# Patient Record
Sex: MALE | Race: WHITE | NOT HISPANIC OR LATINO | Employment: OTHER | URBAN - METROPOLITAN AREA
[De-identification: names, ages, dates, MRNs, and addresses within clinical notes are randomized per-mention and may not be internally consistent; named-entity substitution may affect disease eponyms.]

---

## 2020-07-21 ENCOUNTER — TELEPHONE (OUTPATIENT)
Dept: NEPHROLOGY | Facility: CLINIC | Age: 75
End: 2020-07-21

## 2020-07-21 NOTE — TELEPHONE ENCOUNTER
Appt given August.  His wife  from glomelular nephritis and Dr Raines was recommended by Dr Bhatt.     I scanned his referral information from Dr Bhatt and attached to the  appointment encounter.     He also has historical information from [facility] available in Care Everywhere for [facility].

## 2020-07-31 PROBLEM — R06.02 SHORTNESS OF BREATH: Status: ACTIVE | Noted: 2020-07-31

## 2020-07-31 PROBLEM — E11.9 TYPE 2 DIABETES MELLITUS WITHOUT COMPLICATION, WITHOUT LONG-TERM CURRENT USE OF INSULIN: Status: ACTIVE | Noted: 2020-07-31

## 2020-07-31 PROBLEM — G61.81 CIDP (CHRONIC INFLAMMATORY DEMYELINATING POLYNEUROPATHY): Status: ACTIVE | Noted: 2020-07-31

## 2020-08-02 PROBLEM — N17.9 ACUTE ON CHRONIC RENAL FAILURE: Status: ACTIVE | Noted: 2020-08-02

## 2020-08-02 PROBLEM — N18.9 ACUTE ON CHRONIC RENAL FAILURE: Status: ACTIVE | Noted: 2020-08-02

## 2020-08-02 PROBLEM — J40 BRONCHITIS: Status: ACTIVE | Noted: 2020-08-02

## 2020-08-03 PROBLEM — I82.409 ACUTE DVT (DEEP VENOUS THROMBOSIS): Status: ACTIVE | Noted: 2020-08-03

## 2020-08-06 ENCOUNTER — TELEPHONE (OUTPATIENT)
Dept: NEPHROLOGY | Facility: CLINIC | Age: 75
End: 2020-08-06

## 2020-08-06 NOTE — TELEPHONE ENCOUNTER
His dialysis arrangements are being finalized.  When that is done we will communicate that with him  He will be seen in dialysis by me.  Anticipate d/c today/tomorrow

## 2020-08-06 NOTE — TELEPHONE ENCOUNTER
Spoke to pt's daughter and she states they did not discharge the pt because they did not have a chair for him at dialysis. Pt wants to be discharged. Pt Will travel to [place] or [facility] for dialysis if it means he can be discharged tomorrow. Please advise. She would like to hear back from us if not today tomorrow.

## 2020-08-06 NOTE — TELEPHONE ENCOUNTER
----- Message from Dom SMYTH Pradeeprolando sent at 8/6/2020  1:43 PM CDT -----  Contact: Veronica/Chantel Golden called in regarding patient.  Chantel stated that patient has never actually seen Dr. Raines at [facility] but Dr. Raines did come see him while he has been in the hospital.  Patient had a NP appointment for 4pm today that had to be cancelled.  Chantel stated that there seems to be some confusion as to when patient is going to be discharged & what his next plan of treatment will be?    Chantel would like a call back number is [phone]

## 2020-08-10 RX ORDER — LORAZEPAM 1 MG/1
1 TABLET ORAL NIGHTLY PRN
Qty: 30 TABLET | Refills: 1 | Status: SHIPPED | OUTPATIENT
Start: 2020-08-10 | End: 2021-02-10 | Stop reason: SDUPTHER

## 2020-08-12 ENCOUNTER — TELEPHONE (OUTPATIENT)
Dept: NEPHROLOGY | Facility: CLINIC | Age: 75
End: 2020-08-12

## 2020-08-12 NOTE — TELEPHONE ENCOUNTER
1.  Jean Paul called in iniitially at 420 to say her sister left a message several days ago and have not heard back from Dr. Raines.  She didn't understand why he couldn't have an appointment.  Educated re: how dialysis rounding works and advised, per MD, he plans to round on Mr. Ahn Friday at the dialysis unit.  Informed she may forward questions to the staff at [facility] or she is always welcome to call me. During the call we were disconnected twice.  We were able to resume conversation both times.      2.  I corrected the pharmacy information on the chart as the prescription was sent to old pharmacy.  I cancelled that prescription.  I then faxed to his preferred pharmacy ([address]).  They came to  a hard copy of the prescription.    Since they were anxious to get it, I gave it to the pharmacy here to fill and cancelled the prescription faxed to the [facility]'s by phone.       3.  I also provided updated Involvement in care form and signed it.      I left them waiting outside in the van and provided pharmacy with daughter phone number for when the RX was ready.  The patient and daughter seemed pleased with these interactions in the end.

## 2020-08-14 PROBLEM — N18.6 ESRD (END STAGE RENAL DISEASE): Status: ACTIVE | Noted: 2020-08-14

## 2020-08-19 ENCOUNTER — TELEPHONE (OUTPATIENT)
Dept: NEPHROLOGY | Facility: CLINIC | Age: 75
End: 2020-08-19

## 2020-08-19 NOTE — TELEPHONE ENCOUNTER
"She reports the first he was on it he felt great.  On Monday sally started having chest pain and started cramping.  He asked the nurse or tech and they explained they were pulling more off and he was not very happy with that.  Daughter wanted to know if they can "go back a little" and pull less and maybe have a more gradual increase if necessary.   "

## 2020-08-19 NOTE — TELEPHONE ENCOUNTER
----- Message from Odette Cortez sent at 8/19/2020  9:16 AM CDT -----  Type: Needs Medical Advice  Who Called:  Tiago / daughter  Best Call Back Number: [phone]  Additional Information: pt fell on Monday while being dialyzed. States that the nurse explained to him that more needed to be pulled off. Wants to know if less can be pulled off. Please give call back to discuss. thanks

## 2020-08-21 ENCOUNTER — TELEPHONE (OUTPATIENT)
Dept: NEPHROLOGY | Facility: CLINIC | Age: 75
End: 2020-08-21

## 2020-08-21 NOTE — TELEPHONE ENCOUNTER
----- Message from Bailey Solorio sent at 8/21/2020  3:06 PM CDT -----  Regarding: call back  Pt calling in regards to a call back from nurse in office please and can be contact at [phone]

## 2020-08-21 NOTE — TELEPHONE ENCOUNTER
I will forward to Dr Raines to see if he is able to address their questions at the dialysis unit.

## 2020-09-09 ENCOUNTER — TELEPHONE (OUTPATIENT)
Dept: VASCULAR SURGERY | Facility: CLINIC | Age: 75
End: 2020-09-09

## 2020-09-09 NOTE — TELEPHONE ENCOUNTER
----- Message from Zahra Bhatt sent at 9/9/2020 11:55 AM CDT -----  Regarding: appt access  Contact: abran hubbard/ michael hubbard/ michael ph#[phone], calling to schedule new consult with Dr Cartwright with mapping vessel and permit dialysis access  Call the patient to schedule at [phone] or [phone] daughter rylee chavira

## 2020-10-08 ENCOUNTER — TELEPHONE (OUTPATIENT)
Dept: VASCULAR SURGERY | Facility: CLINIC | Age: 75
End: 2020-10-08

## 2020-10-08 NOTE — TELEPHONE ENCOUNTER
Left multiple messages on home, mobile and daughter's mobile requesting a call back and advising of canceled appointment due to Dr Cartwright booking out of clinic for emergency surgery.

## 2020-11-05 ENCOUNTER — TELEPHONE (OUTPATIENT)
Dept: VASCULAR SURGERY | Facility: CLINIC | Age: 75
End: 2020-11-05

## 2020-11-05 NOTE — TELEPHONE ENCOUNTER
----- Message from Lorrie Momin sent at 11/5/2020  2:40 PM CST -----  Contact: daughter-rylee  Pt needs to reschedule her appointment that was no show.  Please call to advise.    Call Back: [phone]    Thanks

## 2020-12-02 ENCOUNTER — TELEPHONE (OUTPATIENT)
Dept: VASCULAR SURGERY | Facility: CLINIC | Age: 75
End: 2020-12-02

## 2020-12-02 NOTE — TELEPHONE ENCOUNTER
LATE ENTRY 12/2/20 1017  Left message on daughter's voicemail requesting a call back to rescheduled appointment, Dr Cartwright canceled clinic due to emergency surgery.

## 2020-12-03 NOTE — TELEPHONE ENCOUNTER
----- Message from Jennifer Comer sent at 12/3/2020  1:01 PM CST -----  Name of Who is Calling: JOSELITO GANDHI JR [[id number]]    What is the request in detail:Patient is requesting a call back  in regards to becoming a new Patient  Please contact to further discuss and advise      Can the clinic reply by [facility]: no     What Number to Call Back if not in [facility] : [phone]

## 2020-12-10 ENCOUNTER — OFFICE VISIT (OUTPATIENT)
Dept: VASCULAR SURGERY | Facility: CLINIC | Age: 75
End: 2020-12-10
Payer: MEDICARE

## 2020-12-10 VITALS
DIASTOLIC BLOOD PRESSURE: 76 MMHG | HEART RATE: 41 BPM | SYSTOLIC BLOOD PRESSURE: 128 MMHG | WEIGHT: 211.63 LBS | BODY MASS INDEX: 33.21 KG/M2 | HEIGHT: 67 IN

## 2020-12-10 DIAGNOSIS — N18.6 CKD (CHRONIC KIDNEY DISEASE) STAGE V REQUIRING CHRONIC DIALYSIS: Primary | ICD-10-CM

## 2020-12-10 DIAGNOSIS — Z99.2 CKD (CHRONIC KIDNEY DISEASE) STAGE V REQUIRING CHRONIC DIALYSIS: Primary | ICD-10-CM

## 2020-12-10 PROCEDURE — 99205 PR OFFICE/OUTPT VISIT, NEW, LEVL V, 60-74 MIN: ICD-10-PCS | Mod: S$PBB,,, | Performed by: THORACIC SURGERY (CARDIOTHORACIC VASCULAR SURGERY)

## 2020-12-10 PROCEDURE — 99205 OFFICE O/P NEW HI 60 MIN: CPT | Mod: S$PBB,,, | Performed by: THORACIC SURGERY (CARDIOTHORACIC VASCULAR SURGERY)

## 2020-12-10 PROCEDURE — 99999 PR PBB SHADOW E&M-EST. PATIENT-LVL IV: CPT | Mod: PBBFAC,,, | Performed by: THORACIC SURGERY (CARDIOTHORACIC VASCULAR SURGERY)

## 2020-12-10 PROCEDURE — 99214 OFFICE O/P EST MOD 30 MIN: CPT | Mod: PBBFAC,PO | Performed by: THORACIC SURGERY (CARDIOTHORACIC VASCULAR SURGERY)

## 2020-12-10 PROCEDURE — 99999 PR PBB SHADOW E&M-EST. PATIENT-LVL IV: ICD-10-PCS | Mod: PBBFAC,,, | Performed by: THORACIC SURGERY (CARDIOTHORACIC VASCULAR SURGERY)

## 2020-12-10 NOTE — LETTER
December 10, 2020      Shaheed Raines MD  [address] [facility] [address]  [address]  [facility] [address]           [facility]  [address] [place] [address]  Phone: [phone]          Patient: Poncho Ahn   MR Number: [id number]   YOB: 1945   Date of Visit: 12/10/2020       Dear Dr. Shaheed Raines:    Thank you for referring Poncho Ahn to me for evaluation. Attached you will find relevant portions of my assessment and plan of care.    If you have questions, please do not hesitate to call me. I look forward to following Poncho Ahn along with you.    Sincerely,    Nguyen Cartwright MD    Enclosure  CC:  No Recipients    If you would like to receive this communication electronically, please contact [email or web address] or [phone] to request more information on EpicCare Link access.    For providers and/or their staff who would like to refer a patient to [facility], please contact us through our one-stop-shop provider referral line, [facility], at [phone].    If you feel you have received this communication in error or would no longer like to receive these types of communications, please e-mail [email or web address]

## 2020-12-10 NOTE — PROGRESS NOTES
OFFICE VISIT      This patient was referred to me by Dr. Shaheed Raines      HISTORY OF PRESENT ILLNESS:  The patient is a 75-year-old gentleman with chronic kidney disease stage 5 requiring chronic hemodialysis.  He is being dialyzed via a tunneled hemodialysis catheter.  He needs an arteriovenous access.    Past Medical History:   Diagnosis Date    Asthma     CIDP (chronic inflammatory demyelinating polyneuropathy)     Diabetes mellitus, type 2     GERD (gastroesophageal reflux disease)     Hernia, abdominal     Hyperlipemia     Hypertension     Irregular heartbeat     SHIN (obstructive sleep apnea)     Renal disorder     stage 3 renal failure    Thyroid disease        Past Surgical History:   Procedure Laterality Date    APPENDECTOMY      CHOLECYSTECTOMY      TONSILLECTOMY         Review of patient's allergies indicates:   Allergen Reactions    Metronidazole Itching and Other (See Comments)     CRAMPS      Aspirin Other (See Comments)     GI UPSET    Dicyclomine Other (See Comments)     Hallucinations     Escitalopram oxalate Other (See Comments)     BREATHING DIFFICULTY    Fenofibric acid     Humibid     Ibuprofen Other (See Comments)     Upset stomach    Levofloxacin     Morphine     Penicillins Other (See Comments)    Promethazine Other (See Comments)     Hallucinations     Septa     Sulfa (sulfonamide antibiotics)     Trimethoprim     Triphenylmethane analogues Other (See Comments)       Current Outpatient Medications   Medication Sig Dispense Refill    acetaminophen (TYLENOL) 325 MG tablet Take 325 mg by mouth every 6 (six) hours as needed for Pain.      albuterol (PROVENTIL/VENTOLIN HFA) 90 mcg/actuation inhaler Inhale 2 puffs into the lungs every 6 (six) hours as needed for Wheezing. Rescue 8 g 3    alogliptin (NESINA) 12.5 mg Tab Take 6.25 mg by mouth once daily.      apixaban (ELIQUIS) 5 mg Tab Take 2 tablets (10 mg total) by mouth 2 (two) times daily. Take 2 tablets (10  mg) PO BID x 13 more doses (next dose 2100 on 8/7/20), then take 1 tablet (5 mg) PO BID thereafter. Do not stop taking unless instructed to by MD. 60 tablet 1    atorvastatin (LIPITOR) 80 MG tablet Take 80 mg by mouth once daily.      bismuth subsalicylate (BISMUTH) 262 mg Chew Take 1-2 tablets by mouth as needed (indigestion).       cholecalciferol, vitamin D3, (VITAMIN D3) 25 mcg (1,000 unit) capsule Take 1 capsule (1,000 Units total) by mouth once daily.  0    doxazosin (CARDURA) 4 MG tablet Take 4 mg by mouth every morning.       esomeprazole (NEXIUM) 20 MG capsule Take 20 mg by mouth before breakfast.      finasteride (PROSCAR) 5 mg tablet Take 5 mg by mouth once daily.      FLUoxetine 20 MG capsule Take 60 mg by mouth once daily.      folic acid/multivit-min/lutein (CENTRUM SILVER ORAL) Take 1 tablet by mouth once daily.      gabapentin (NEURONTIN) 800 MG tablet Take 800 mg by mouth 3 (three) times daily.      hyoscyamine (ANASPAZ,LEVSIN) 0.125 mg Tab Take 2 tablets (250 mcg total) by mouth every evening.      LORazepam (ATIVAN) 1 MG tablet Take 1 tablet (1 mg total) by mouth nightly as needed for Anxiety. 30 tablet 1    montelukast (SINGULAIR) 10 mg tablet Take 10 mg by mouth every evening.      ondansetron (ZOFRAN) 4 MG tablet Take 1 tablet (4 mg total) by mouth every 8 (eight) hours as needed for Nausea.       No current facility-administered medications for this visit.        History reviewed. No pertinent family history.    Social History     Socioeconomic History    Marital status:      Spouse name: Not on file    Number of children: Not on file    Years of education: Not on file    Highest education level: Not on file   Occupational History    Not on file   Social Needs    Financial resource strain: Not on file    Food insecurity     Worry: Not on file     Inability: Not on file    Transportation needs     Medical: Not on file     Non-medical: Not on file   Tobacco Use     Smoking status: Never Smoker    Smokeless tobacco: Never Used   Substance and Sexual Activity    Alcohol use: Never     Frequency: Never    Drug use: Never    Sexual activity: Not on file   Lifestyle    Physical activity     Days per week: Not on file     Minutes per session: Not on file    Stress: Not on file   Relationships    Social connections     Talks on phone: Not on file     Gets together: Not on file     Attends Spiritism service: Not on file     Active member of club or organization: Not on file     Attends meetings of clubs or organizations: Not on file     Relationship status: Not on file   Other Topics Concern    Not on file   Social History Narrative    Not on file       REVIEW OF SYSTEMS:  Weakness of the right hand since there was an attempt to access the right radial artery for an arterial blood gas.  He has generalized weakness.  He using uses a roller walker to walk.  All other systems are reviewed and are negative.        PHYSICAL EXAM:  Physical Exam  Vitals signs and nursing note reviewed.   Constitutional:       General: He is not in acute distress.     Appearance: He is not ill-appearing, toxic-appearing or diaphoretic.   HENT:      Head: Normocephalic and atraumatic.   Eyes:      General: No scleral icterus.     Extraocular Movements: Extraocular movements intact.      Conjunctiva/sclera: Conjunctivae normal.      Pupils: Pupils are equal, round, and reactive to light.   Neck:      Musculoskeletal: Normal range of motion.      Comments: There is a tunneled catheter exiting the right anterior chest wall and coursing up in the subcutaneous tunnel into the neck and into the right internal jugular vein.  Cardiovascular:      Rate and Rhythm: Normal rate and regular rhythm.      Comments: Normal bilateral brachial radial and femoral pulses  Pulmonary:      Effort: Pulmonary effort is normal. No respiratory distress.      Breath sounds: No stridor.   Abdominal:      Palpations: Abdomen is  soft.      Tenderness: There is no abdominal tenderness. There is no guarding or rebound.   Musculoskeletal: Normal range of motion.      Right lower leg: No edema.      Left lower leg: No edema.   Skin:     General: Skin is warm.   Neurological:      General: No focal deficit present.      Mental Status: He is alert and oriented to person, place, and time.      Cranial Nerves: No cranial nerve deficit.      Motor: Weakness present.      Gait: Gait abnormal.         Vitals:    12/10/20 1519   BP: 128/76   Pulse: (!) 41         IMPRESSION:  1.  Chronic kidney disease stage 5 requiring chronic hemodialysis  2.  Indwelling tunneled hemodialysis catheter  3.  Diabetes mellitus  4.  Obstructive sleep apnea  5.  Hypertension  6.  Hyperlipidemia  7.  Chronic inflammatory demyelinating polyneuropathy (CIDP)    RECOMMENDATIONS:  I think the patient needs an arteriovenous access.  He has a very good cephalic vein at the wrist.  He was unsure as to whether he wanted it done on the right side or on the left side.  He is right-handed and does most things with his right hand although he is somewhat weak in the right hand after an attempt to access the radial artery for arterial blood gases.  In the end he decided that he wanted it done on the left upper extremity.  We will proceed with left Gabino arteriovenous fistula.        Zack Cartwright MD

## 2020-12-11 ENCOUNTER — TELEPHONE (OUTPATIENT)
Dept: VASCULAR SURGERY | Facility: CLINIC | Age: 75
End: 2020-12-11

## 2021-01-04 ENCOUNTER — TELEPHONE (OUTPATIENT)
Dept: VASCULAR SURGERY | Facility: CLINIC | Age: 76
End: 2021-01-04

## 2021-01-11 PROBLEM — Z71.89 ADVANCED CARE PLANNING/COUNSELING DISCUSSION: Status: ACTIVE | Noted: 2021-01-11

## 2021-01-11 PROBLEM — U07.1 COVID-19 VIRUS INFECTION: Status: ACTIVE | Noted: 2021-01-11

## 2021-01-12 PROBLEM — Z99.2 ESRD ON DIALYSIS: Status: ACTIVE | Noted: 2020-08-14

## 2021-01-29 DIAGNOSIS — Z79.01 LONG TERM (CURRENT) USE OF ANTICOAGULANTS: ICD-10-CM

## 2021-01-29 DIAGNOSIS — N18.6 CKD (CHRONIC KIDNEY DISEASE) STAGE V REQUIRING CHRONIC DIALYSIS: Primary | ICD-10-CM

## 2021-01-29 DIAGNOSIS — N18.6 CHRONIC KIDNEY DISEASE, STAGE V REQUIRING CHRONIC DIALYSIS: ICD-10-CM

## 2021-01-29 DIAGNOSIS — Z99.2 CKD (CHRONIC KIDNEY DISEASE) STAGE V REQUIRING CHRONIC DIALYSIS: Primary | ICD-10-CM

## 2021-01-29 DIAGNOSIS — Z99.2 CHRONIC KIDNEY DISEASE, STAGE V REQUIRING CHRONIC DIALYSIS: ICD-10-CM

## 2021-02-01 RX ORDER — LIDOCAINE HYDROCHLORIDE 10 MG/ML
1 INJECTION, SOLUTION EPIDURAL; INFILTRATION; INTRACAUDAL; PERINEURAL ONCE
Status: CANCELLED | OUTPATIENT
Start: 2021-02-01 | End: 2021-02-01

## 2021-02-10 ENCOUNTER — TELEPHONE (OUTPATIENT)
Dept: VASCULAR SURGERY | Facility: CLINIC | Age: 76
End: 2021-02-10

## 2021-02-11 PROBLEM — N18.6 CHRONIC KIDNEY DISEASE, STAGE V REQUIRING CHRONIC DIALYSIS: Status: ACTIVE | Noted: 2021-02-11

## 2021-02-11 PROBLEM — Z99.2 CHRONIC KIDNEY DISEASE, STAGE V REQUIRING CHRONIC DIALYSIS: Status: ACTIVE | Noted: 2021-02-11

## 2021-02-11 RX ORDER — LORAZEPAM 1 MG/1
1 TABLET ORAL NIGHTLY PRN
Qty: 30 TABLET | Refills: 1 | Status: SHIPPED | OUTPATIENT
Start: 2021-02-11 | End: 2021-04-16 | Stop reason: SDUPTHER

## 2021-02-22 ENCOUNTER — TELEPHONE (OUTPATIENT)
Dept: VASCULAR SURGERY | Facility: CLINIC | Age: 76
End: 2021-02-22

## 2021-02-23 ENCOUNTER — TELEPHONE (OUTPATIENT)
Dept: VASCULAR SURGERY | Facility: CLINIC | Age: 76
End: 2021-02-23

## 2021-02-23 DIAGNOSIS — Z99.2 CKD (CHRONIC KIDNEY DISEASE) STAGE V REQUIRING CHRONIC DIALYSIS: Primary | ICD-10-CM

## 2021-02-23 DIAGNOSIS — Z79.01 LONG TERM (CURRENT) USE OF ANTICOAGULANTS: ICD-10-CM

## 2021-02-23 DIAGNOSIS — N18.6 CKD (CHRONIC KIDNEY DISEASE) STAGE V REQUIRING CHRONIC DIALYSIS: Primary | ICD-10-CM

## 2021-02-23 DIAGNOSIS — Z99.2 CHRONIC KIDNEY DISEASE, STAGE V REQUIRING CHRONIC DIALYSIS: ICD-10-CM

## 2021-02-23 DIAGNOSIS — N18.6 CHRONIC KIDNEY DISEASE, STAGE V REQUIRING CHRONIC DIALYSIS: ICD-10-CM

## 2021-02-23 DIAGNOSIS — Z01.818 PREOP TESTING: ICD-10-CM

## 2021-02-23 RX ORDER — LIDOCAINE HYDROCHLORIDE 10 MG/ML
1 INJECTION, SOLUTION EPIDURAL; INFILTRATION; INTRACAUDAL; PERINEURAL ONCE
Status: CANCELLED | OUTPATIENT
Start: 2021-02-23 | End: 2021-02-23

## 2021-02-25 PROBLEM — R07.9 CHEST PAIN: Status: ACTIVE | Noted: 2021-02-25

## 2021-02-27 PROBLEM — I82.502 CHRONIC DEEP VEIN THROMBOSIS (DVT) OF LEFT LOWER EXTREMITY: Status: ACTIVE | Noted: 2021-02-27

## 2021-02-27 PROBLEM — N18.9 ACUTE ON CHRONIC RENAL FAILURE: Status: RESOLVED | Noted: 2020-08-02 | Resolved: 2021-02-27

## 2021-02-27 PROBLEM — N17.9 ACUTE ON CHRONIC RENAL FAILURE: Status: RESOLVED | Noted: 2020-08-02 | Resolved: 2021-02-27

## 2021-03-10 ENCOUNTER — TELEPHONE (OUTPATIENT)
Dept: VASCULAR SURGERY | Facility: CLINIC | Age: 76
End: 2021-03-10

## 2021-04-01 ENCOUNTER — OFFICE VISIT (OUTPATIENT)
Dept: VASCULAR SURGERY | Facility: CLINIC | Age: 76
End: 2021-04-01
Payer: MEDICARE

## 2021-04-01 VITALS
WEIGHT: 205 LBS | BODY MASS INDEX: 32.95 KG/M2 | HEIGHT: 66 IN | DIASTOLIC BLOOD PRESSURE: 80 MMHG | HEART RATE: 48 BPM | SYSTOLIC BLOOD PRESSURE: 133 MMHG

## 2021-04-01 DIAGNOSIS — Z99.2 CKD (CHRONIC KIDNEY DISEASE) STAGE V REQUIRING CHRONIC DIALYSIS: Primary | ICD-10-CM

## 2021-04-01 DIAGNOSIS — N18.6 CKD (CHRONIC KIDNEY DISEASE) STAGE V REQUIRING CHRONIC DIALYSIS: Primary | ICD-10-CM

## 2021-04-01 PROCEDURE — 99999 PR PBB SHADOW E&M-EST. PATIENT-LVL III: CPT | Mod: PBBFAC,,, | Performed by: THORACIC SURGERY (CARDIOTHORACIC VASCULAR SURGERY)

## 2021-04-01 PROCEDURE — 99999 PR PBB SHADOW E&M-EST. PATIENT-LVL III: ICD-10-PCS | Mod: PBBFAC,,, | Performed by: THORACIC SURGERY (CARDIOTHORACIC VASCULAR SURGERY)

## 2021-04-01 PROCEDURE — 99024 PR POST-OP FOLLOW-UP VISIT: ICD-10-PCS | Mod: POP,,, | Performed by: THORACIC SURGERY (CARDIOTHORACIC VASCULAR SURGERY)

## 2021-04-01 PROCEDURE — 99024 POSTOP FOLLOW-UP VISIT: CPT | Mod: POP,,, | Performed by: THORACIC SURGERY (CARDIOTHORACIC VASCULAR SURGERY)

## 2021-04-01 PROCEDURE — 99213 OFFICE O/P EST LOW 20 MIN: CPT | Mod: PBBFAC,PO | Performed by: THORACIC SURGERY (CARDIOTHORACIC VASCULAR SURGERY)

## 2021-04-16 RX ORDER — LORAZEPAM 1 MG/1
1 TABLET ORAL DAILY PRN
Qty: 30 TABLET | Refills: 3 | Status: SHIPPED | OUTPATIENT
Start: 2021-04-16 | End: 2021-06-21 | Stop reason: SDUPTHER

## 2021-04-29 ENCOUNTER — PATIENT MESSAGE (OUTPATIENT)
Dept: RESEARCH | Facility: HOSPITAL | Age: 76
End: 2021-04-29

## 2021-05-10 ENCOUNTER — TELEPHONE (OUTPATIENT)
Dept: VASCULAR SURGERY | Facility: CLINIC | Age: 76
End: 2021-05-10

## 2021-06-21 RX ORDER — LORAZEPAM 1 MG/1
1 TABLET ORAL DAILY PRN
Qty: 30 TABLET | Refills: 3 | Status: SHIPPED | OUTPATIENT
Start: 2021-06-21 | End: 2022-09-26 | Stop reason: SDUPTHER

## 2021-09-27 ENCOUNTER — TELEPHONE (OUTPATIENT)
Dept: NEPHROLOGY | Facility: CLINIC | Age: 76
End: 2021-09-27

## 2021-10-01 ENCOUNTER — TELEPHONE (OUTPATIENT)
Dept: NEPHROLOGY | Facility: CLINIC | Age: 76
End: 2021-10-01

## 2021-10-01 DIAGNOSIS — R06.02 SOB (SHORTNESS OF BREATH): Primary | ICD-10-CM

## 2021-10-08 PROBLEM — D62 ACUTE BLOOD LOSS ANEMIA: Status: ACTIVE | Noted: 2021-10-08

## 2021-10-08 PROBLEM — K92.2 GI BLEED: Status: ACTIVE | Noted: 2021-10-08

## 2021-10-08 PROBLEM — D64.9 SYMPTOMATIC ANEMIA: Status: ACTIVE | Noted: 2021-10-08

## 2021-12-08 RX ORDER — BENZONATATE 100 MG/1
100 CAPSULE ORAL 2 TIMES DAILY PRN
Qty: 30 CAPSULE | Refills: 0 | Status: SHIPPED | OUTPATIENT
Start: 2021-12-08 | End: 2021-12-18

## 2021-12-08 RX ORDER — AZITHROMYCIN 250 MG/1
TABLET, FILM COATED ORAL
Qty: 6 TABLET | Refills: 0 | Status: SHIPPED | OUTPATIENT
Start: 2021-12-08 | End: 2021-12-13

## 2021-12-22 DIAGNOSIS — M25.551 RIGHT HIP PAIN: Primary | ICD-10-CM

## 2022-01-25 ENCOUNTER — TELEPHONE (OUTPATIENT)
Dept: NEPHROLOGY | Facility: CLINIC | Age: 77
End: 2022-01-25

## 2022-01-25 RX ORDER — ASCORBIC ACID 500 MG
2 TABLET ORAL DAILY
COMMUNITY
Start: 2021-05-13

## 2022-01-25 RX ORDER — DIPHENHYDRAMINE HCL 25 MG
CAPSULE ORAL
COMMUNITY
Start: 2021-05-13 | End: 2023-02-27 | Stop reason: SDUPTHER

## 2022-01-25 NOTE — TELEPHONE ENCOUNTER
----- Message from Dean Ashley sent at 1/25/2022 12:46 PM CST -----  Contact: pt  Type: Needs Medical Advice    Pharmacy name and phone #:    [id number] - [place] [id number] [facility] [address]  [address]  [facility] [address]  Phone: [phone] Fax: [phone]  Best Call Back Number: [phone]    Additional Information: pt picked up OTC Pepcid, and the pharmacist told him to contact you and let you know that he takes the medication.  Please call at your earliest convenience.

## 2022-02-07 ENCOUNTER — TELEPHONE (OUTPATIENT)
Dept: NEPHROLOGY | Facility: CLINIC | Age: 77
End: 2022-02-07
Payer: MEDICARE

## 2022-02-07 DIAGNOSIS — G61.81 CIDP (CHRONIC INFLAMMATORY DEMYELINATING POLYNEUROPATHY): Primary | ICD-10-CM

## 2022-02-08 ENCOUNTER — TELEPHONE (OUTPATIENT)
Dept: NEUROLOGY | Facility: CLINIC | Age: 77
End: 2022-02-08
Payer: MEDICARE

## 2022-02-08 NOTE — TELEPHONE ENCOUNTER
----- Message from Sofia Lagunas LPN sent at 2/7/2022  2:18 PM CST -----  Sofia Lagunas LPN  Nephrology  PH   [phone] x [phone]     [phone]  [phone]

## 2022-02-21 ENCOUNTER — TELEPHONE (OUTPATIENT)
Dept: VASCULAR SURGERY | Facility: CLINIC | Age: 77
End: 2022-02-21
Payer: MEDICARE

## 2022-02-21 NOTE — TELEPHONE ENCOUNTER
Spoke with patients daughter regarding issues with AVF. Appointment made 3/10/22 at 2:30 pm. Verbalized understanding and agreeable.

## 2022-02-21 NOTE — TELEPHONE ENCOUNTER
----- Message from Rodolfo Stark sent at 2/21/2022  3:22 PM CST -----  Contact: pt's daughter Jean Paul at  [phone]  Type:  Sooner Apoointment Request  Caller is requesting a sooner appointment.  Caller declined first available appointment listed below.  Caller will not accept being placed on the waitlist and is requesting a message be sent to doctor.  Name of Caller:  pt's daughter Jean Paul  When is the first available appointment?  N/A  Symptoms:  left arm was messed up today at dialysis  Best Call Back Number:  [phone]  Additional Information:  pt's daughter Jean Paul is calling to schedule an appt for her dad who suffered from an arm injury at dialysis today. Please call back and advise.

## 2022-03-23 PROBLEM — J11.1 INFLUENZA: Status: ACTIVE | Noted: 2022-03-23

## 2022-03-23 PROBLEM — J96.01 ACUTE HYPOXEMIC RESPIRATORY FAILURE: Status: ACTIVE | Noted: 2020-07-31

## 2022-04-13 ENCOUNTER — OUTPATIENT CASE MANAGEMENT (OUTPATIENT)
Dept: ADMINISTRATIVE | Facility: OTHER | Age: 77
End: 2022-04-13
Payer: MEDICARE

## 2022-04-13 NOTE — LETTER
April 26, 2022             Dear ,    Welcome to [facility]s Complex Care Management Program.  It was a pleasure talking with you today.  My name is Yumiko Cox, and I look forward to being your Care Manager.  My goal is to help you function at the healthiest and highest level possible.  You can contact me directly at [phone].    As an [facility] patient, some of the services we may be able to provide include:      Development of an individualized care plan with a Registered Nurse    Connection with a    Connection with available resources and services     Coordinate communication among your care team members    Provide coaching and education    Help you understand your doctors treatment plan   Help you obtain information about your insurance coverage.     All services provided by [facility]s Complex Care Managers and other care team members are coordinated with and communicated to your primary care team.      As part of your enrollment, you will be receiving education materials and more information about these services in your [facility] account, by phone or through the mail.  If you do not wish to participate or receive information, please contact our office at [phone].      Sincerely,        Yumiko Cox RN  [facility]   Out-patient RN Complex Care Manager

## 2022-04-13 NOTE — PROGRESS NOTES
Outpatient Care Management  Initial Patient Assessment    Patient: Poncho Ahn Jr.  MRN: [id number]  Date of Service: 04/13/2022  Completed by: Catherine Cox RN  Referral Date: 04/13/2022  Program: High Risk  Status: Ongoing  Effective Dates: 4/26/2022 - present  Responsible Staff: Catherine Cox RN    4/13/22  1654  Attempt assessment with patient for outpatient case management. Left voicemail message requesting call back. RN OPCM 1st assessment attempt.  Yumiko Cox RN    4/14/22 5365  Called [phone] and left a message. Called [phone] and spoke to 's daughter Chantel. Chantel stated that she thinks her father will be interested in the OPCM program. She stated that she will have him call RN case manager later today for possible enrollment. RN OPCM 2nd assessment attempt. Unable to Reach letter sent.   Yumiko Cox RN    4/14/22 2439  Received a call from  regarding OPCM program enrollment.  requested that RN case  him back on Tuesday 4/19/22 at 3PM for program enrollment.  gave verbal permission for RN case manager to speak with Blanca Gallegos and/or Chantel Gómez (his two daughters) regarding his health conditions.    4/19/22 0212  Spoke with Anabelle briefly regarding OPCM enrollment.  requested taht RN case  him back on Thursday at 3PM because he is currently being seen by one of his therapist. RN case manager will attempt enrollment call on Thursday 4/21/22 at 3 PM.  Yumiko Cox RN    4/21/22 6787  Attempt assessment with patient for outpatient case management. Left voicemail message requesting call back. RN OPCM 3rd assessment attempt.  Yumiko Cox RN     Reason for Visit   Patient presents with    OPCM Chart Review     4/13/22, 4/14/22, 4/19/22, 4/26/22    OPCM RN First Assessment Attempt     4/13/22    OPCM RN Second Assessment Attempt     4/14/22    Other Misc     4/14/22 Requesting Enrollment Call for Tuesday    4/26/22 Med Rec     OPCM RN Third Assessment Attempt     4/21/22    OPCM Enrollment Call     4/26/22    Initial Assessment     4/26/22    Nursing Assessment     4/26/22    Plan Of Care     4/26/22    OPCM Welcome Letter     4/26/22 4/26/22  Brief Summary:  Reported by Mr.Boucher Poncho Ahn Jr. was referred by Dr. Mauri Lara for DM2, ESRD and CIDP. Patient qualifies for program based on High Risk Score of 86%.  Active problem list, medical, surgical and social history reviewed. Active comorbidities include long term use of anticoagulants, asthma, CIDP, CKD on dialysis M-W-F, DVT, HLD, TN, SHIN. Areas of need identified by patient include Depression and Fall Prevention.      lives with one of his two daughters (Kylie Gallegos) and his grandson.  does not drive.  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  was recently receiving home health SN and PT services that have been concluded at this time. Mr. Ahn attends dialysis on M-W-F from 11am - 3:30PM. Mrs. Ahn states that he and his daughter, Kylie recently purchased a home (they are currently renting a home). They plan to move into the new home sometime next month. Mr. Ahn reports losing his wife of 51 years in 2017, he admits to a depressed mood related to losing his wife.PHQ 9 screening score 9.  Mr. Ahn reports having several falls, usually occurring in the bathroom. He reports that his daughter has to help him in and out of the shower. Mr. Ahn states that he currently has a walker, tub/shower seat and a glucometer.     Complex care plan created with patient/caregiver input. By next encounter, patient agrees to review educational materials (Depression, Medicines for Depression, When you have Depression and another Health Problem, Tips for How to Help Your Mood, Preventing Falls in the Older Adult, Beginner Standing Balance Exercises and ACP informational  packet)  after receiving them in the mail.     CM ACTION PLAN:  · Send  educational material for review (Depression, Medicines for Depression, When you have Depression and another Health Problem, Tips for How to Help Your Mood, Preventing Falls in the Older Adult, Beginner Standing Balance Exercises and ACP informational packet) via [facility].  · Refer to  for assistance with transportation to and from medical appointments/dialysis/pharmacy, Mental Health/Counseling services (PHQ 9 screening score 9), Home bathroom modifications, and ACP discussion.  · Send message to Doris Marie LCSW regarding referral   · Send message to Dr. Lara regarding enrollment in OPCM program  Assessment Documentation     OPCM Initial Assessment    Involvement of Care  Do I have permission to speak with other family members about your care?: Yes  Assessment completed by: Patient  Identified Areas of Need  Advanced Care Planning: Yes  Housing: no  Medication Adherence: No  *Active medication list was reviewed and reconciled with patient and/or caregiver:   Nutrition: no  Lab Adherence: no  Depression: Yes (Comment: PHQ 9 screening score = 9)  Cognitive/Behavioral Health: yes  Communication: no  Health Literacy: no  Fall risk?: Yes  Equipment/Supplies/Services: no          Problem List and History     Problems Addressed This Visit    COPD: Not identified by patient as current problem  Hypertension: Not identified by patient as current problem  Diabetes: Not identified by patient as current problem  Anemia: Not identified by patient as current problem  Chronic Kidney Disease: Not identified by patient as current problem  Hyperlipidemia: Not identified by patient as current problem         Reviewed medical and social history with patient and/or caregiver. A complex care plan was discussed and completed today, with input from patient and/or caregiver.    Patient Instructions     Instructions were provided via the [facility] patient  resources and are available for the patient to view on the patient portal, if active.    Next steps:  · Assess patients ability to perform ADL's  · Confirm receipt of educational materials    Follow up in about 9 days (around 5/5/2022).    Todays OPCM Self-Management Care Plan was developed with the patients/caregivers input and was based on identified barriers from todays assessment.  Goals were written today with the patient/caregiver and the patient has agreed to work towards these goals to improve his/her overall well-being. Patient verbalized understanding of the care plan, goals, and all of today's instructions. Encouraged patient/caregiver to communicate with his/her physician and health care team about health conditions and the treatment plan.  Provided my contact information today and encouraged patient/caregiver to call me with any questions as needed.

## 2022-04-13 NOTE — LETTER
April 14, 2022    Poncho Ahn Jr.  [address] [facility] [place] [address]             [facility] [address] [place] [address] Dear Mr. Ahn:    My name is Yumiko Cox and I am a  with [facility]s outpatient case management department. We received a referral from Dr. Lara to call you to discuss your medical history. I attempted to reach you by telephone, but I was unsuccessful. Please call our department that we may go over some questions with you regarding your health. My phone number is [phone]. This is a FREE program from [facility].     The outpatient case management department can be reached at [phone] from 8:00 am to 4:30 PM on Monday thru Friday. [facility] also has a program where a nurse is available 24/7 to answer questions or provide medical advice their number is [phone].      If you have any questions or concerns, please don't hesitate to call.    Sincerely,        Yumiok Cox RN

## 2022-05-02 RX ORDER — HYDROCODONE POLISTIREX AND CHLORPHENIRAMINE POLISTIREX 10; 8 MG/5ML; MG/5ML
5 SUSPENSION, EXTENDED RELEASE ORAL EVERY 12 HOURS PRN
Qty: 50 ML | Refills: 0 | Status: SHIPPED | OUTPATIENT
Start: 2022-05-02

## 2022-05-05 ENCOUNTER — OUTPATIENT CASE MANAGEMENT (OUTPATIENT)
Dept: ADMINISTRATIVE | Facility: OTHER | Age: 77
End: 2022-05-05
Payer: MEDICARE

## 2022-05-05 NOTE — PROGRESS NOTES
Outpatient Care Management  Plan of Care Follow Up Visit    Patient: Poncho Ahn Jr.  MRN: [id number]  Date of Service: 05/05/2022  Completed by: Yumiko Cox RN  Referral Date: 05/16/2022  Program:  Outpatient High Risk Case Management Program     Reason for Visit   Patient presents with    OPCM Chart Review     5/5/22, 5/14/22, 5/26/22, 6/2/22    Other Misc     5/5/22 Appointment Coordination  5/26/22 Unable to Reach Letter Sent    OPCM RN First Follow-Up Attempt     5/5/22    OPCM RN Second Follow-Up Attempt     5/14/22    OPCM RN Third Follow-Up Attempt     5/26/22    Update Plan Of Care     6/2/22   .5/5/22  Attempt to follow up with patient/caregiver for outpatient case management. Spoke to Mr. Ahn and he could be heard wheezing over the telephone. He stated that he was seen in the [facility] ED on Monday 5/2/22 for productive cough, SOB and fatigue. He states that he is not feeling any better. RN case manager spoke with Ana M Matthews (daughter/caregiver) and she stated that her father was a little better after the visit to the ED. RN case manager was able to schedule an appointment tomorrow morning Friday 5/6/22 at 0830 with Dr. Juan. Notified Mr. Ahn and Ana M Matthews of appointment date/time with Dr. Juan. Both  and Ana M Matthews verbalized understanding of appointment date/time with Dr. Juan. RN case manager will attempt follow up with Mr. Ahn at another time. RN OPCM 1st follow up attempt.  Yumiko Cox RN    5/14/22  Attempt to follow up with patient/caregiver for outpatient case management. Voice mailbox hasn't been set up, unable to leave a message at [phone]. RN THOMM 2nd follow up attempt.  Yumiko Cox RN    5/26/22  Attempt to follow up with patient/caregiver for outpatient case management. Voice mailbox hasn't been set up, unable to leave a message at [phone].Left voice mail message at [phone] requesting call back. RN THOMM 3rd follow up attempt. Unable to  reach patient letter sent with RN case  information via [facility] and patient portal. RN case manager will leave case open, as SW is actively working with patient at this time.  Yumiko Cox RN      6/2/22  Brief Summary:   See Depression Care Plan for all other updates/details R/T to this encounter.       Patient Summary     Involvement of Care:  Do I have permission to speak with other family members about your care?       Patient Reported Labs & Vitals:  1.  Any Patient Reported Labs & Vitals?     2.  Patient Reported Blood Pressure:     3.  Patient Reported Pulse:     4.  Patient Reported Weight (Kg):     5.  Patient Reported Blood Glucose (mg/dl):       Medical and social history was reviewed with patient and/or caregiver.     Clinical Assessment     Reviewed and provided basic information on available community resources for mental health, transportation, wellness resources, and palliative care programs with patient and/or caregiver.     Complex Care Plan     Care plan was discussed and completed today with input from patient and/or caregiver.    Patient Instructions     Instructions were provided via the [employer] patient [employer] and are available for the patient to view on the patient portal.    Next Steps:   · Educate patient on the signs and symptoms of depression (Excessive sleeping, over eating, not eating, Sad feelings, excessive crying, hopelessness, lack of interested in activities, etc)  · Utilize Teach Back method for signs and symptoms of depression  · OPCM team to complete PHQ/ROSE MARIE with patient, as needed for update of progress/safety    Follow up in about 12 days (around 6/14/2022).    [facility] OPCM Self-Management Care Plan was developed with the patients/caregivers input and was based on identified barriers from todays assessment.  Goals were written today with the patient/caregiver and the patient has agreed to work towards these goals to improve his/her overall well-being. Patient  verbalized understanding of the care plan, goals, and all of today's instructions. Encouraged patient/caregiver to communicate with his/her physician and health care team about health conditions and the treatment plan.  Provided my contact information today and encouraged patient/caregiver to call me with any questions as needed

## 2022-05-05 NOTE — LETTER
May 14, 2022    Poncho Ahn   [address] [facility] [place] [address]             [facility] [address] [place] [address] Dear Mr. Ahn:    I have been unsuccessful at reaching you to follow-up to see how you have been doing. Please call me back at your earliest convenience to discuss your health care needs. I can be reached at [phone] from 8:00AM to 4:30 PM on Monday thru Friday.    [facility] is a program offered through [facility] where a nurse is available 24/7 to answer questions or provide medical advice, their number is [phone].     If you have any questions or concerns, please don't hesitate to call.    Sincerely,        Yumiko Cox RN

## 2022-05-05 NOTE — LETTER
May 26, 2022    Poncho Ahn   [address] [facility] [place] [address]             [facility] [address] [place] [address] Dear Mr. Ahn:      I have been unsuccessful at reaching you to follow-up to see how you have been doing. Please call me back at your earliest convenience to discuss your health care needs. I can be reached at [phone] from 8:00AM to 4:30 PM on Monday thru Friday.    [facility] is a program offered through [facility] where a nurse is available 24/7 to answer questions or provide medical advice, their number is [phone].      Sincerely,        Yumiko Cox RN

## 2022-05-06 ENCOUNTER — OUTPATIENT CASE MANAGEMENT (OUTPATIENT)
Dept: ADMINISTRATIVE | Facility: OTHER | Age: 77
End: 2022-05-06
Payer: MEDICARE

## 2022-05-06 NOTE — PROGRESS NOTES
Chart reviewed. Pt has HD MWF, prefers calls on T, Th. This LCSW will contact pt at a later date.

## 2022-05-09 RX ORDER — AZITHROMYCIN 250 MG/1
250 TABLET, FILM COATED ORAL DAILY
Qty: 6 TABLET | Refills: 0 | Status: SHIPPED | OUTPATIENT
Start: 2022-05-09 | End: 2022-05-16

## 2022-05-10 NOTE — PROGRESS NOTES
Outpatient Care Management   - High Risk Patient Assessment    Patient: Poncho Ahn Jr.  MRN:  [id number]  Date of Service:  5/12/2022  Completed by:  Doris Marie LCSW  Referral Date: 04/13/2022  Program:     Reason for Visit   Patient presents with    [facility] Chart Review     5/6/22    Social Work Assessment - High Risk     5/10/22    Plan Of Care     5/10/22       Brief Summary:  received a referral from [facility] RN Yumiko Leggett for the following patient identified psycho-social needs:  Advance Care Planning (ACP), transportation, bathroom modification, counseling- PHQ=9. Care plan was created in collaboration with patient/caregiver input.     Patient Summary     [facility] Social Work Assessment (High Risk)    Involvement of Care  Do I have permission to speak with other family members about your care?: Yes (Comment: daughters Chantel and Kylie)  Assessment completed by: Patient, Children  Cognitive  Which of the following can you state?: Name, Date of birth, Year, Address, President  Cognitive barriers?: No  General  Marital status:   Current employment status: Retired and not working  Support  Level of Caregiver support: Caregiver currently provides assistance (Comment: Lives with daughter Kylie and grandson. Pt ambulates with a rollator and is indep with most ADL's. Family assists with IADL's)  Support system: Children, Grandchildren, Friends  Is the caregiver reporting burnout?: No  Support Barriers?: Yes (Comment: bathroom modification)  Advanced Care Planning  Do you have any of the following?: None  If yes, do we have a copy?: N/A  If no, would you like Advance Directive resources?: No (Comment: OPCM RN mailed)  Advance Care Planning resources provided?: No  Is Advance Care Planning an area of need?: No  Financial  Current medical coverage: Medicare, Other (see comment) (Comment: BCBS Federal supplement)  Are you unable to pay any of the following?: None  Financial  Support Barriers?: No  Safety  Safety barriers?: No   History  Do you or your spouse currently or formerly serve in the ?: Yes, formerly   branch: [employer]  Wartime service?: Yes (Comment: [place])   discharge type: Honorable  Current use of VA services?: Yes  Disaster Plan  Established evacuation plan?: Yes  [place] residence: [facility]  Evacuation location: family would provide  Mental Health Status  Emotional status: Telephonic/Unable to assess  Have you recenetly lost a loved one?: Yes (Comment: wife passed away 2017)  Psychiatric diagnosis: depression, anxiety  Current mental health treatment: Yes (Comment: Prozac, Xanax)  Would you like mental health resources?: Yes  Current symptoms: Sleep disturbances, Restlessness, Other (Use comment), Memory problems (Comment: decrease in appetite)  Mental/Behavioral/Environmental risk: None  Mental Health Barriers?: Yes  Addictive Behaviors  Current alcohol consumption?: No  Current substance abuse?: No  Gambling habits?: No  Was the PHQ depression screening completed?: No  Was the ROSE MARIE-7 completed?: No         Complex Care Plan     Care plan was discussed and completed today with input from patient and/or caregiver.    Patient Instructions     Follow up in about 13 days (around 5/19/2022) for call with LALO    [facility] OPCM Self-Management Care Plan was developed with the patients/caregivers input and was based on identified barriers from [facility] assessment.  Goals were written today with the patient/caregiver and the patient has agreed to work towards these goals to improve his/her overall well-being. Patient verbalized understanding of the care plan, goals, and all of today's instructions. Encouraged patient/caregiver to communicate with his/her physician and health care team about health conditions and the treatment plan.  Provided my contact information today and encouraged patient/caregiver to call me with any questions as  needed.

## 2022-05-20 ENCOUNTER — OUTPATIENT CASE MANAGEMENT (OUTPATIENT)
Dept: ADMINISTRATIVE | Facility: OTHER | Age: 77
End: 2022-05-20
Payer: MEDICARE

## 2022-05-20 NOTE — LETTER
May 30, 2022    Poncho Ahn Jr.  [address] [facility] [place] [address]             [facility] [address] [place] [address] Dear :         I am writing from the Outpatient Care Management Department at [facility].  I have been unsuccessful at reaching you to follow up to see how you have been doing. I hope you find the resources previously provided to you helpful. Please contact me for further assistance.    I can be reached at [phone] Monday thru Friday from 8:00am to 4:30pm.  [facility] also has a program with a nurse available 24/7 to answer questions or provide medical advice.  [facility] can be reached at [phone].      If you have any questions or concerns, please don't hesitate to call.      Sincerely,  DERRICK JainW

## 2022-05-20 NOTE — PROGRESS NOTES
Collaborating with Dr. Darius TOWNSEND, Alyse Echols re: pts interest in counseling and pt reports Prozac isn't working.

## 2022-05-24 NOTE — PROGRESS NOTES
Inbox message to Dr. Lester TOWNSEND requesting referral for Psychology per pts request for counseling.    1st Attempt to complete SW follow-up for Outpatient Care Management; left message for pts daughter Chantel requesting return call.  LCSW will reattempt at a later date.

## 2022-05-26 ENCOUNTER — PATIENT MESSAGE (OUTPATIENT)
Dept: ADMINISTRATIVE | Facility: OTHER | Age: 77
End: 2022-05-26
Payer: MEDICARE

## 2022-05-30 NOTE — PROGRESS NOTES
2nd Attempt to complete SW follow-up for Outpatient Care Management; left message for daughter Chantel requesting a return call and hospitalsW mailed a letter with contact information requesting a return call.  OPCM RN notified.

## 2022-06-02 NOTE — PROGRESS NOTES
Retrieved phone message 6/1/22 from pts daughter Kylie who was calling this LCSW back after messages left for daughter Chantel.  No answer; left message with contact information requesting return call.

## 2022-06-13 DIAGNOSIS — F32.A DEPRESSION, UNSPECIFIED DEPRESSION TYPE: Primary | ICD-10-CM

## 2022-06-21 ENCOUNTER — OUTPATIENT CASE MANAGEMENT (OUTPATIENT)
Dept: ADMINISTRATIVE | Facility: OTHER | Age: 77
End: 2022-06-21
Payer: MEDICARE

## 2022-06-21 NOTE — PROGRESS NOTES
6/30/22  Attempt to follow up with patient for outpatient case management.  Left voice mail message requesting call back. RN OPCM 3rd follow up attempt. In basket message sent to  informing him that RN case manager has been unsuccessful at reaching Mr. Ahn for F/U x3 attempts and his case has been closed. RN case manager mailed [facility] brochure with RN case  information to Mr. Ahn. RN case manager sent Maggie diaz in basket message regarding referral entered by Dr. Serrato for psychology on 6/13/22 requesting that someone from her office reach out to Mr. Ahn with first available appointment. CASE CLOSED.   Yumiko Cox RN    6/28/22  Attempt to follow up with patient/caregiver for outpatient case management. Left voice mail message requesting call back. RN OPCM 2nd follow up attempt. OPCM Unable to Reach Patient for Follow Up Letter sent via [employer].  Yumiko Cox RN    6/21/22  Attempt to follow up with patient/caregiver for outpatient case management. No voice mailbox set up, unable to leave a message. No answer. RN OPCM 1st follow up attempt.  Yumiko Cox RN

## 2022-06-21 NOTE — LETTER
June 28, 2022    Poncho Ahn   [address] [facility] [place] [address]             [facility] [address] [place] [address] Dear Mr. Ahn:    I have been unsuccessful at reaching you to follow-up to see how you have been doing. Please call me back at your earliest convenience to discuss your health care needs. I can be reached at [phone] from 8:00AM to 4:30 PM on Monday thru Friday.    [facility] is a program offered through [facility] where a nurse is available 24/7 to answer questions or provide medical advice, their number is [phone].    Sincerely,         Yumiko Cox RN   Outpatient Case Management

## 2022-07-20 RX ORDER — MEGESTROL ACETATE 40 MG/1
40 TABLET ORAL DAILY
Qty: 30 TABLET | Refills: 1 | Status: SHIPPED | OUTPATIENT
Start: 2022-07-20 | End: 2023-03-28

## 2022-07-28 PROBLEM — N18.6 CKD (CHRONIC KIDNEY DISEASE) STAGE V REQUIRING CHRONIC DIALYSIS: Status: RESOLVED | Noted: 2021-02-11 | Resolved: 2022-07-28

## 2022-07-28 PROBLEM — Z99.2 CKD (CHRONIC KIDNEY DISEASE) STAGE V REQUIRING CHRONIC DIALYSIS: Status: RESOLVED | Noted: 2021-02-11 | Resolved: 2022-07-28

## 2022-07-28 PROBLEM — Z86.16 PERSONAL HISTORY OF COVID-19: Status: ACTIVE | Noted: 2021-01-11

## 2022-07-28 PROBLEM — R07.9 CHEST PAIN: Status: RESOLVED | Noted: 2021-02-25 | Resolved: 2022-07-28

## 2022-07-28 PROBLEM — I82.409 ACUTE DVT (DEEP VENOUS THROMBOSIS): Status: RESOLVED | Noted: 2020-08-03 | Resolved: 2022-07-28

## 2022-07-28 PROBLEM — E11.9 TYPE 2 DIABETES MELLITUS WITHOUT COMPLICATION, WITHOUT LONG-TERM CURRENT USE OF INSULIN: Status: RESOLVED | Noted: 2020-07-31 | Resolved: 2022-07-28

## 2022-07-28 PROBLEM — D62 ACUTE BLOOD LOSS ANEMIA: Status: RESOLVED | Noted: 2021-10-08 | Resolved: 2022-07-28

## 2022-07-28 PROBLEM — D64.9 SYMPTOMATIC ANEMIA: Status: RESOLVED | Noted: 2021-10-08 | Resolved: 2022-07-28

## 2022-07-28 PROBLEM — Z86.718 PERSONAL HISTORY OF DVT (DEEP VEIN THROMBOSIS): Status: ACTIVE | Noted: 2021-02-27

## 2022-07-28 PROBLEM — K92.2 GI BLEED: Status: RESOLVED | Noted: 2021-10-08 | Resolved: 2022-07-28

## 2022-09-19 DIAGNOSIS — R06.02 SOB (SHORTNESS OF BREATH): Primary | ICD-10-CM

## 2022-09-26 RX ORDER — LORAZEPAM 1 MG/1
1 TABLET ORAL DAILY PRN
Qty: 30 TABLET | Refills: 2 | Status: SHIPPED | OUTPATIENT
Start: 2022-09-26 | End: 2023-03-28

## 2022-12-16 RX ORDER — PREDNISONE 20 MG/1
20 TABLET ORAL DAILY
Qty: 5 TABLET | Refills: 0 | Status: SHIPPED | OUTPATIENT
Start: 2022-12-16 | End: 2022-12-21

## 2023-02-06 DIAGNOSIS — R60.9 SWELLING: Primary | ICD-10-CM

## 2023-03-08 ENCOUNTER — TELEPHONE (OUTPATIENT)
Dept: SURGERY | Facility: CLINIC | Age: 78
End: 2023-03-08
Payer: MEDICARE

## 2023-03-08 NOTE — TELEPHONE ENCOUNTER
----- Message from Mlau Marrero sent at 3/8/2023  2:59 PM CST -----  Type:Needs Medical Advice    Who Called:Ana M Gallegos (Daughter)  Best call back number:[phone]  Additional Info: Requesting a call back regarding Daughter called to schedule a NP appt for her father. Please call to discuss appt.  Please Advise- Thank you

## 2023-03-08 NOTE — TELEPHONE ENCOUNTER
I called patients daughter Blanca to schedule an appointment with Dr. Brown on Wednesday, 3/22/23 @ 3:45pm in [place].  Aaron

## 2023-03-14 NOTE — TELEPHONE ENCOUNTER
----- Message from Etelvina Perez sent at 3/14/2023  8:58 AM CDT -----  Contact: Pt  Type: RX Refill Request  Who Called:Pt  Refill or New RX:Refill  RX Name and Strength:LORazepam (ATIVAN) 1 MG tablet ()  How is the patient currently taking it: As Directed  Is this a 30 or 90 day : as Directed  Preferred Pharmacy/Phone Number:  [id number] - [place] [address] [address] [place] [address]  Phone: [phone] Fax: [phone]    Best Call Back Number:[phone]    Additional Information :N/A

## 2023-03-20 RX ORDER — BUPROPION HYDROCHLORIDE 75 MG/1
75 TABLET ORAL DAILY
Qty: 30 TABLET | Refills: 11 | Status: SHIPPED | OUTPATIENT
Start: 2023-03-20 | End: 2023-03-22

## 2023-03-21 RX ORDER — LORAZEPAM 1 MG/1
1 TABLET ORAL DAILY PRN
Qty: 30 TABLET | Refills: 2 | OUTPATIENT
Start: 2023-03-21 | End: 2023-04-20

## 2023-03-22 ENCOUNTER — OFFICE VISIT (OUTPATIENT)
Dept: SURGERY | Facility: CLINIC | Age: 78
End: 2023-03-22
Payer: MEDICARE

## 2023-03-22 ENCOUNTER — HOSPITAL ENCOUNTER (OUTPATIENT)
Dept: RADIOLOGY | Facility: HOSPITAL | Age: 78
Discharge: HOME | End: 2023-03-22
Attending: SURGERY
Payer: MEDICARE

## 2023-03-22 VITALS
HEIGHT: 66 IN | HEART RATE: 76 BPM | SYSTOLIC BLOOD PRESSURE: 117 MMHG | DIASTOLIC BLOOD PRESSURE: 76 MMHG | TEMPERATURE: 98 F | WEIGHT: 178.38 LBS | BODY MASS INDEX: 28.67 KG/M2

## 2023-03-22 DIAGNOSIS — M53.3 COCCYDYNIA: Primary | ICD-10-CM

## 2023-03-22 DIAGNOSIS — R07.81 RIB PAIN: ICD-10-CM

## 2023-03-22 PROCEDURE — 99203 OFFICE O/P NEW LOW 30 MIN: CPT | Mod: S$PBB,,, | Performed by: SURGERY

## 2023-03-22 PROCEDURE — 99213 OFFICE O/P EST LOW 20 MIN: CPT | Mod: PBBFAC,PO | Performed by: SURGERY

## 2023-03-22 PROCEDURE — 99999 PR PBB SHADOW E&M-EST. PATIENT-LVL III: ICD-10-PCS | Mod: PBBFAC,,, | Performed by: SURGERY

## 2023-03-22 PROCEDURE — 99999 PR PBB SHADOW E&M-EST. PATIENT-LVL III: CPT | Mod: PBBFAC,,, | Performed by: SURGERY

## 2023-03-22 PROCEDURE — 71046 X-RAY EXAM CHEST 2 VIEWS: CPT | Mod: 26,,, | Performed by: RADIOLOGY

## 2023-03-22 PROCEDURE — 71046 X-RAY EXAM CHEST 2 VIEWS: CPT | Mod: TC,FY,PO

## 2023-03-22 PROCEDURE — 71100 X-RAY EXAM RIBS UNI 2 VIEWS: CPT | Mod: TC,FY,PO,RT

## 2023-03-22 PROCEDURE — 71100 XR RIBS 2 VIEW RIGHT: ICD-10-PCS | Mod: 26,RT,, | Performed by: RADIOLOGY

## 2023-03-22 PROCEDURE — 71100 X-RAY EXAM RIBS UNI 2 VIEWS: CPT | Mod: 26,RT,, | Performed by: RADIOLOGY

## 2023-03-22 PROCEDURE — 71046 XR CHEST PA AND LATERAL: ICD-10-PCS | Mod: 26,,, | Performed by: RADIOLOGY

## 2023-03-22 PROCEDURE — 99203 PR OFFICE/OUTPT VISIT, NEW, LEVL III, 30-44 MIN: ICD-10-PCS | Mod: S$PBB,,, | Performed by: SURGERY

## 2023-03-22 RX ORDER — ALBUTEROL SULFATE 90 UG/1
1-2 AEROSOL, METERED RESPIRATORY (INHALATION)
COMMUNITY
Start: 2023-03-22 | End: 2023-08-01 | Stop reason: SDUPTHER

## 2023-03-22 NOTE — PROGRESS NOTES
Subjective:       Patient ID: Poncho Ahn Jr. is a 78 y.o. male.    Chief Complaint: Consult (Rectal pain)    HPI    pleasant 78-year-old gentleman who was referred to me for evaluation of a lesion in tender area in his upper buttock region.  He notes the last 6-8 weeks he has had a swelling nodular lesion that is tender to the touch.  He notes that causes pain when he sits down.  Has pain with movement of the area.  He presents for evaluation.  Denies any bleeding or changes in bowel habits.  He does suffer with end-stage renal disease requiring dialysis.  Also suffers with anxiety hypertension.  No significant past surgical history  Review of Systems   Constitutional:  Negative for activity change and appetite change.   Cardiovascular:  Negative for chest pain and claudication.   Gastrointestinal:  Positive for rectal pain. Negative for abdominal distention and abdominal pain.   Musculoskeletal:  Negative for arthralgias.   Hematological:  Negative for adenopathy. Does not bruise/bleed easily.   Psychiatric/Behavioral:  Negative for agitation and decreased concentration.        Objective:      Physical Exam  Cardiovascular:      Rate and Rhythm: Normal rate.   Pulmonary:      Effort: Pulmonary effort is normal.   Abdominal:      General: Abdomen is flat. There is no distension.      Tenderness: There is no abdominal tenderness.   Skin:            Comments: In the upper buttock just to the left of midline at the base the tailbone is some slight swelling and induration.  There is no palpable lesion or distinct lesion noted.   Neurological:      Mental Status: He is alert.       Assessment:     Tailbone pain  Problem List Items Addressed This Visit    None      Plan:       Discussion with patient.  I am unable to palpate or appreciate a firm distinct mass that I could surgically removed.  I suspect much of his pain is related to pressure and pain from sitting on the tailbone.  He has the appearance of someone who  was lost a significant amount weight over the last several months to year.  He reports to me that he has lost approximately 60-75 lb the last 5 months.  This began with the initiation of dialysis.  Given that there is no obvious lesion to remove will obtain an ultrasound to ensure there is no underlying lesion that I am missing.  Will call patient with results of ultrasound

## 2023-03-24 ENCOUNTER — TELEPHONE (OUTPATIENT)
Dept: SURGERY | Facility: CLINIC | Age: 78
End: 2023-03-24
Payer: MEDICARE

## 2023-03-24 NOTE — TELEPHONE ENCOUNTER
I called and spoke to patient's daughter Blanca to schedule his Ultrasound soft tissue of the buttocks on Thursday, 3/30/23 @ 3:30pm @ [facility] in [place]. Patient is to arrive @ 3:15pm.  Also, I informed her of his negative Chest x-ray results per the Nurse. Aaron

## 2023-04-05 DIAGNOSIS — G62.9 NEUROPATHY: Primary | ICD-10-CM

## 2023-04-21 PROBLEM — J11.1 INFLUENZA: Status: RESOLVED | Noted: 2022-03-23 | Resolved: 2023-04-21

## 2023-05-04 ENCOUNTER — TELEPHONE (OUTPATIENT)
Dept: NEPHROLOGY | Facility: CLINIC | Age: 78
End: 2023-05-04
Payer: MEDICARE

## 2023-05-04 NOTE — TELEPHONE ENCOUNTER
----- Message from Tosin Glasgow, Patient Care Assistant sent at 5/4/2023 10:34 AM CDT -----  Contact: self  Pt is calling to speak with a nurse in regards to a med and reflux  issues  [phone]  thanks

## 2023-05-08 PROBLEM — E87.1 HYPONATREMIA: Status: ACTIVE | Noted: 2023-05-08

## 2023-05-08 PROBLEM — J81.1 PULMONARY EDEMA: Status: ACTIVE | Noted: 2023-05-08

## 2023-07-01 ENCOUNTER — NURSE TRIAGE (OUTPATIENT)
Dept: ADMINISTRATIVE | Facility: CLINIC | Age: 78
End: 2023-07-01
Payer: MEDICARE

## 2023-07-01 NOTE — TELEPHONE ENCOUNTER
Reason for Disposition   No answer.  First attempt to contact caller.  Follow-up call scheduled within 15 minutes.   Message left on identified voice mail    Protocols used: No Contact or Duplicate Contact Call-A-  Unable to reach pt. Left two voice messages.

## 2023-07-03 ENCOUNTER — TELEPHONE (OUTPATIENT)
Dept: NEPHROLOGY | Facility: CLINIC | Age: 78
End: 2023-07-03
Payer: MEDICARE

## 2023-07-03 RX ORDER — ONDANSETRON 4 MG/1
4 TABLET, FILM COATED ORAL EVERY 8 HOURS PRN
Qty: 60 TABLET | Refills: 5 | Status: SHIPPED | OUTPATIENT
Start: 2023-07-03 | End: 2023-11-10 | Stop reason: SDUPTHER

## 2023-07-03 NOTE — TELEPHONE ENCOUNTER
----- Message from Marie Barnes sent at 7/1/2023  9:46 AM CDT -----  Contact: Patients  Type:  RX Refill Request    Who Called:   Patient  Refill or New Rx:  Refill    RX Name and Strength:  ondansetron (ZOFRAN) 4 MG tablet    Preferred Pharmacy with phone number:      [id number] - [place] [address] & [address] [address]  Phone: [phone] Fax: [phone]    Local or Mail Order:  Local  Ordering Provider:  DR Raines    Would the patient rather a call back or a response via [facility]?   Call back  Best Call Back Number:  [phone]    Additional Information:   States he need 180 quantity of this medication called in to the pharmacy for nausea - states it's ok to write generic please - please call to advise - thank you

## 2023-07-19 ENCOUNTER — TELEPHONE (OUTPATIENT)
Dept: NEPHROLOGY | Facility: CLINIC | Age: 78
End: 2023-07-19
Payer: MEDICARE

## 2023-07-19 NOTE — TELEPHONE ENCOUNTER
----- Message from Althea Coreas sent at 7/19/2023  1:46 PM CDT -----  Contact: Tiago  Type:  Needs Medical Advice    Who Called: Daughter Tiago  Would the patient rather a call back or a response via [facility]? call  Best Call Back Number: [phone]     Additional Information: pt just transitioned to assisted living and he just had an anxiety attack. Daughter wants to know if pt can be back on the Xanox or Valium. Pt would like to speak with nurse asap. Please advise and thank you.

## 2023-07-19 NOTE — TELEPHONE ENCOUNTER
The patient just transitioned to assisted living and he had an anxiety attack . The daughter would like to know if patient can be prescribed xanax.I am sending this message to . Please advise.

## 2023-07-19 NOTE — TELEPHONE ENCOUNTER
Hello.  I will defer treatment of his anxiety and depression to his PCP.  He also is asking for his Home Health to be restarted.  Thanks!

## 2023-07-20 ENCOUNTER — TELEPHONE (OUTPATIENT)
Dept: NEPHROLOGY | Facility: CLINIC | Age: 78
End: 2023-07-20
Payer: MEDICARE

## 2023-07-20 NOTE — TELEPHONE ENCOUNTER
----- Message from Marie Barnes sent at 7/20/2023 10:01 AM CDT -----  Contact: Patient  Type:  RX Refill Request    Who Called:   Patient  Refill or New Rx:  New    RX Name and Strength:  Anxiety medication    Preferred Pharmacy with phone number:      [id number] - [address] [facility] [address] [address]  Phone: [phone] Fax: [phone]    Local or Mail Order:  Local  Ordering Provider:  Dr Raines    Would the patient rather a call back or a response via [facility]?   Call back  Best Call Back Number:  [phone]      Additional Information: States he is waiting for a call back from the office - states Dr Raines is supposed to be writing a prescription for him for an anxiety medication - states he would like to speak with someone in the office - please call to advise - thank you

## 2023-07-20 NOTE — TELEPHONE ENCOUNTER
Patient is asking for a written prescription for an anxiety medication. I am sending this message to .

## 2023-07-20 NOTE — TELEPHONE ENCOUNTER
would like patient to refer to his PCP for this medication. I am routing this message to patient's PCP.

## 2023-07-21 ENCOUNTER — TELEPHONE (OUTPATIENT)
Dept: NEPHROLOGY | Facility: CLINIC | Age: 78
End: 2023-07-21
Payer: MEDICARE

## 2023-07-21 NOTE — TELEPHONE ENCOUNTER
Patient is calling due to his anxiety . He wants to be put on anxiety medication. I am sending this message to his PCP per  request. Please call patient to advise. Please let me know how I can help.

## 2023-07-21 NOTE — TELEPHONE ENCOUNTER
Breanna with the call center advised that Chantel in not on the most recent comm auth as of 1/2023.  He also needs appt PER .   Patient to be seen in clinic today.    Arlet Webb, RN, BSN  Nephrology Care Coordinator  [facility]

## 2023-07-21 NOTE — TELEPHONE ENCOUNTER
Chantel called back  Breanna with the call center advised that Chantel in not on the most recent comm auth as of 1/2023.  He also needs appt PER .

## 2023-07-21 NOTE — TELEPHONE ENCOUNTER
LOV 5/16/2023  Chantel is not on Comm Auth  Called patient's number [phone] (M) and LMOVM to call back

## 2023-07-21 NOTE — TELEPHONE ENCOUNTER
----- Message from Althea Coreas sent at 7/21/2023 11:45 AM CDT -----  Contact: Chantel  Type:  Needs Medical Advice    Who Called: Chantel  Would the patient rather a call back or a response via [facility]? call  Best Call Back Number:     Additional Information: pt has severe anxiety and wants to be back on rx ativan. Please advise and thank you.

## 2023-09-21 ENCOUNTER — TELEPHONE (OUTPATIENT)
Dept: FAMILY MEDICINE | Facility: CLINIC | Age: 78
End: 2023-09-21
Payer: MEDICARE

## 2023-09-21 NOTE — TELEPHONE ENCOUNTER
----- Message from Gen Londono sent at 9/20/2023  9:51 AM CDT -----  Contact: Pt  Type:  Sooner Apoointment Request    Caller is requesting a sooner appointment.  Caller declined first available appointment listed below.  Caller will not accept being placed on the waitlist and is requesting a message be sent to doctor.  Name of Caller:JOSELITO GANDHI JR. [[id number]]    When is the first available appointment?no available appointments     Symptoms: Est Care / Annual Visit     Would the patient rather a call back or a response via [facility]?  Call back     Best Call Back Number: [phone] (mobile)     Additional Information: Pt is available on Tuesday's and Thursday's

## 2023-10-17 ENCOUNTER — TELEPHONE (OUTPATIENT)
Dept: FAMILY MEDICINE | Facility: CLINIC | Age: 78
End: 2023-10-17

## 2023-10-17 NOTE — TELEPHONE ENCOUNTER
----- Message from Adán Fernandez sent at 10/16/2023  2:24 PM CDT -----  Type: Needs Medical Advice  Who Called:  pt  Symptoms (please be specific):  I told pt the dr will not taking  any new pt's he said Dr. Shaheed Raines is his friend and he was going to ask him to take him--please call and advise  Best Call Back Number: [phone]    Additional Information: thank you

## 2023-10-20 ENCOUNTER — TELEPHONE (OUTPATIENT)
Dept: FAMILY MEDICINE | Facility: CLINIC | Age: 78
End: 2023-10-20

## 2023-10-20 NOTE — TELEPHONE ENCOUNTER
----- Message from Najma Lerner sent at 10/19/2023 12:39 PM CDT -----  Regarding: np request  Name of Who is Calling:JOSELITO GANDHI JR. [[id number]]          What is the request in detail:    Pt states that he spoke to office about becoming a np from a collage referral by mouth       Can the clinic reply by [facility]: no           What Number to Call Back if not in [facility]:[phone]     Pt is in [employer] between 2:30 and 3:30 please leave a voicemail

## 2023-10-24 ENCOUNTER — TELEPHONE (OUTPATIENT)
Dept: FAMILY MEDICINE | Facility: CLINIC | Age: 78
End: 2023-10-24
Payer: MEDICARE

## 2023-10-24 NOTE — TELEPHONE ENCOUNTER
----- Message from Kelsi Gonzalez sent at 10/24/2023 10:10 AM CDT -----  Contact: pt  Type:  Needs Medical Advice    Who Called: pt    Would the patient rather a call back or a response via [facility]? Call back  Best Call Back Number: [phone]    Additional Information: pt was referred to Dr Michel by Dr Dean Raines. Pt is currently on dialysis and needs a new primary doctor.    Please call to advise  Thanks

## 2023-10-24 NOTE — TELEPHONE ENCOUNTER
Attempted to reach out to the pt to inform him that Dr. Michel is not excepting new pt and that the doctor recommend he stay with his presnt primary care physician. Pt did not answer and voicemail was left for pt to call back.

## 2023-11-07 ENCOUNTER — TELEPHONE (OUTPATIENT)
Dept: FAMILY MEDICINE | Facility: CLINIC | Age: 78
End: 2023-11-07
Payer: MEDICARE

## 2023-11-07 NOTE — TELEPHONE ENCOUNTER
----- Message from Aba Linder sent at 11/7/2023  2:38 PM CST -----  Type:  Sooner Appointment Request    Caller is requesting a sooner appointment.  Caller declined first available appointment listed below.  Caller will not accept being placed on the waitlist and is requesting a message be sent to doctor.    Name of Caller:  Patient  When is the first available appointment?  New Patient-- Pt states that Dr. Shaheed Raines has asked Dr. Michel to take Mr. Ahn as a New Patient    Symptoms:  Establish care    Would the patient rather a call back or a response via [facility]? Call  Best Call Back Number:  [phone]  Additional Information:  Pt is available on Tuesdays and Thursdays-- Non-Dialysis days

## 2023-11-08 NOTE — TELEPHONE ENCOUNTER
I am no longer taking new patients, even ones referred by specialists. Offer him an appointment with a provider accepting new patients.

## 2023-11-10 RX ORDER — ONDANSETRON 4 MG/1
4 TABLET, FILM COATED ORAL EVERY 8 HOURS PRN
Qty: 60 TABLET | Refills: 5 | Status: SHIPPED | OUTPATIENT
Start: 2023-11-10 | End: 2024-01-09

## 2023-11-10 NOTE — TELEPHONE ENCOUNTER
----- Message from Jeff Lucia sent at 11/10/2023  1:19 PM CST -----  Regarding: Rx request  Contact: [phone] [facility]  Type:  Pharmacy Calling to Clarify an RX    Name of Caller:  [facility]  Pharmacy Name:    [facility]  [address], [place] [address]  [phone]  Prescription Name:  ondansetron (ZOFRAN) 4 MG tablet 60 tablet 5 7/3/2023 -   Sig - Route: Take 1 tablet (4 mg total) by mouth every 8 (eight) hours as needed for Nausea. - Or   Sent to pharmacy as: ondansetron (ZOFRAN) 4 MG tablet   E-Prescribing Status: Receipt confirmed by pharmacy (7/3/2023 11:53 AM CDT)       What do they need to clarify?:  Pt states 4mg is not working. Pharm requesting 8mg ODT  Best Call Back Number:  [phone]  Additional Information:  Please send Rx or call Pharm to discuss.

## 2023-12-07 ENCOUNTER — TELEPHONE (OUTPATIENT)
Dept: FAMILY MEDICINE | Facility: CLINIC | Age: 78
End: 2023-12-07
Payer: MEDICARE

## 2023-12-07 NOTE — TELEPHONE ENCOUNTER
----- Message from Rebekah Poppaola sent at 12/7/2023  1:09 PM CST -----  Regarding: Sooner Appointment Request  Contact: Patient at [phone]  Type:  Sooner Appointment Request    Name of Caller:  Patient at [phone]    Additional Information:  Requesting to establish care with Dr. Michel after conversation between Dr. Shaheed Raines and Dr. Michel. Patient is available for appointments on Tuesdays and Thursdays. Please call and advise. Thank you

## 2023-12-07 NOTE — TELEPHONE ENCOUNTER
----- Message from Phuong Steinberg Patient Care Assistant sent at 12/7/2023 12:37 PM CST -----  Contact: Pt  Type:  Sooner Appointment Request    Caller is requesting a sooner appointment.  Caller declined first available appointment listed below.  Caller will not accept being placed on the waitlist and is requesting a message be sent to doctor.    Name of Caller:  Pt  When is the first available appointment?  No avail appts  Symptoms:3 day Dialysis/Med Refill/Est Care  Best Call Back Number:  [phone]  Additional Information:  Pt is a patient of Dr Shaheed Raines and he has spoken with Dr Michel to accept the pt as a new pt. Please advise

## 2023-12-08 ENCOUNTER — TELEPHONE (OUTPATIENT)
Dept: NEPHROLOGY | Facility: CLINIC | Age: 78
End: 2023-12-08
Payer: MEDICARE

## 2023-12-08 NOTE — TELEPHONE ENCOUNTER
----- Message from Agnieszka Childs sent at 12/8/2023  1:37 PM CST -----  Type:  Needs Medical Advice    Who Called: pts daughter Tiago eric    Symptoms (please be specific): anxiety from new information    How long has patient had these symptoms:  na    Pharmacy name and phone #:    [facility] [place] - [address]  [address] [address]  [facility] [address]  Phone: [phone] Fax: [phone]        Would the patient rather a call back or a response via [facility]? Call back    Best Call Back Number: [phone]      Additional Information: pts daughter is calling today to see if they can prescribe her father something for anxiety due to his last hospital visit. She can give more information when you call her. She would like to speak with someone in the office ASAP      Please call Back to advise. Thanks!

## 2023-12-11 ENCOUNTER — TELEPHONE (OUTPATIENT)
Dept: NEPHROLOGY | Facility: CLINIC | Age: 78
End: 2023-12-11
Payer: MEDICARE

## 2023-12-11 NOTE — TELEPHONE ENCOUNTER
----- Message from Agnieszka Childs sent at 12/11/2023 10:23 AM CST -----  Type: Appointment Request    Caller is requesting appointment.      Name of Caller:pts daughter (tiago eric)    When is the first available appointment?na    Symptoms:hospital follow up    Would the patient rather a call back or a response via [facility]? Call back    Best Call Back Number:[phone] Tiago      Additional Information: pt was in the hospital 2 weeks ago and discharged 12/5, they said he had Mitrovalve leak and needs to be put on a certain medication by Dr Raines and then he will be having a procedure done to get it fixed.      Please call Back to advise. Thanks!

## 2023-12-11 NOTE — TELEPHONE ENCOUNTER
Patient is a dialysis patient. Is this something for Santosh? I am sending this message to . Please advise.

## 2023-12-11 NOTE — TELEPHONE ENCOUNTER
I spoke to Dulce Maria at [facility] and will round on him next week.  They will get his records.

## 2023-12-13 ENCOUNTER — NURSE TRIAGE (OUTPATIENT)
Dept: ADMINISTRATIVE | Facility: CLINIC | Age: 78
End: 2023-12-13
Payer: MEDICARE

## 2023-12-14 NOTE — TELEPHONE ENCOUNTER
Poncho currently c/o wheezing, SOB and having to take short shallow breaths. Pt states he is not a Kidney Transplant but is a hemodialysis pt. Recently diagnosed with leaking valve. Went to dialysis today & unable to get fluid off from previous treatment due to cramping & feeling bad. Poncho states by the end of the tx, he began wheezing and was placed on O2. Pt of Dr. Lb Raines. Advised pt per triage protocol to call  now. V/u.    Reason for Disposition   SEVERE difficulty breathing (e.g., struggling for each breath, speaks in single words)    Protocols used: Breathing Difficulty-A-AH

## 2023-12-15 ENCOUNTER — TELEPHONE (OUTPATIENT)
Dept: NEPHROLOGY | Facility: CLINIC | Age: 78
End: 2023-12-15
Payer: MEDICARE

## 2023-12-15 NOTE — TELEPHONE ENCOUNTER
----- Message from Nettie Villalta sent at 12/15/2023  2:13 PM CST -----  Type:  Needs Medical Advice    Who Called:  Pt's Daughter Tiago    Would the patient rather a call back or a response via [facility]?  Call back    Best Call Back Number:  [phone]    Additional Information:  Dr Cao said that the pt needs to be run with 3 potassium wash instead of the 2 because of his heart.   Please call back to advise. Thanks!

## 2023-12-26 ENCOUNTER — PATIENT MESSAGE (OUTPATIENT)
Dept: ADMINISTRATIVE | Facility: CLINIC | Age: 78
End: 2023-12-26
Payer: MEDICARE

## 2023-12-26 ENCOUNTER — PATIENT OUTREACH (OUTPATIENT)
Dept: ADMINISTRATIVE | Facility: CLINIC | Age: 78
End: 2023-12-26
Payer: MEDICARE

## 2023-12-26 NOTE — PROGRESS NOTES
C3 nurse attempted to contact Pnocho Ahn Jr.'s daughter, Chantel, for a TCC post hospital discharge follow up call. No answer. Left voicemail with callback information. The patient has a scheduled HOSFU appointment with Cardiology @ [facility] on 12/27/23 @ 1:00pm.

## 2023-12-27 RX ORDER — NITROGLYCERIN 0.4 MG/1
0.4 TABLET SUBLINGUAL EVERY 5 MIN PRN
COMMUNITY

## 2023-12-27 RX ORDER — ASPIRIN 81 MG/1
1 TABLET ORAL DAILY
COMMUNITY
End: 2024-01-09 | Stop reason: SDUPTHER

## 2023-12-27 RX ORDER — METOPROLOL SUCCINATE 25 MG/1
1 TABLET, EXTENDED RELEASE ORAL DAILY
COMMUNITY
Start: 2023-12-05 | End: 2024-01-09 | Stop reason: SDUPTHER

## 2023-12-27 RX ORDER — BUDESONIDE 0.5 MG/2ML
0.5 INHALANT ORAL 2 TIMES DAILY
COMMUNITY
End: 2024-01-23

## 2023-12-27 RX ORDER — BUPROPION HYDROCHLORIDE 75 MG/1
75 TABLET ORAL 2 TIMES DAILY
COMMUNITY
End: 2024-01-09 | Stop reason: SDUPTHER

## 2023-12-27 RX ORDER — PANTOPRAZOLE SODIUM 40 MG/1
1 TABLET, DELAYED RELEASE ORAL EVERY MORNING
COMMUNITY
End: 2024-01-09

## 2023-12-27 RX ORDER — ONDANSETRON 8 MG/1
8 TABLET, ORALLY DISINTEGRATING ORAL EVERY 6 HOURS PRN
COMMUNITY
Start: 2023-12-22 | End: 2024-01-09 | Stop reason: SDUPTHER

## 2023-12-27 RX ORDER — SIMETHICONE 80 MG
80 TABLET,CHEWABLE ORAL EVERY 6 HOURS PRN
COMMUNITY
End: 2024-01-09 | Stop reason: SDUPTHER

## 2023-12-27 NOTE — PROGRESS NOTES
C3 nurse spoke with Poncho Ahn JrAnya's daughter, Chantel, for a TCC post hospital discharge follow up call. The patient has a scheduled HOSFU appointment with Cardiologist today, and this has been completed. Per the patient's daughter, everything is going well.     Patient's daughter did note that the patient is transitioning from Dr. Mccoy to Dr. Michel, who is closer to the patient. The patient is now in an TUNDE with a 24 hour sitter, but is independent in ADLs, and has nurse provide medications that daughters assist in monitoring.

## 2023-12-28 ENCOUNTER — TELEPHONE (OUTPATIENT)
Dept: FAMILY MEDICINE | Facility: CLINIC | Age: 78
End: 2023-12-28
Payer: MEDICARE

## 2023-12-28 NOTE — TELEPHONE ENCOUNTER
A message was left for him in October that I would not be able to see him as a patient. He should stay with his current physician or establish with a provider accepting new patients.

## 2023-12-28 NOTE — TELEPHONE ENCOUNTER
----- Message from Nina Lucia sent at 12/28/2023  2:46 PM CST -----  Type: Needs Medical Advice  Who Called:  pt  Best Call Back Number: [phone]    Additional Information: pt states that Dr Raines spoke with the Dr Michel and he agreed to take him as a new pt, he is requesting an appt as soon as possible, pl call bk to advise thanks

## 2023-12-29 NOTE — TELEPHONE ENCOUNTER
Called pt, no answer, left a voicemail informing pt that Dr. Michel is no longer accepting new pts, left a list of providers with an open panel for pt to schedule with.

## 2024-01-05 ENCOUNTER — TELEPHONE (OUTPATIENT)
Dept: FAMILY MEDICINE | Facility: CLINIC | Age: 79
End: 2024-01-05
Payer: MEDICARE

## 2024-01-05 NOTE — TELEPHONE ENCOUNTER
Called pt and was unable to reah him. Lvm    Called pts daughter and spoke to her. Informed her of  panel being full. Canceled that appt. Got him to see ines for med refills and she is calling [facility] to get him set up to [facility]

## 2024-01-09 ENCOUNTER — OFFICE VISIT (OUTPATIENT)
Dept: FAMILY MEDICINE | Facility: CLINIC | Age: 79
End: 2024-01-09
Payer: MEDICARE

## 2024-01-09 VITALS
HEART RATE: 75 BPM | OXYGEN SATURATION: 95 % | SYSTOLIC BLOOD PRESSURE: 122 MMHG | BODY MASS INDEX: 26.94 KG/M2 | DIASTOLIC BLOOD PRESSURE: 68 MMHG | WEIGHT: 166.88 LBS

## 2024-01-09 DIAGNOSIS — I50.42 CHRONIC COMBINED SYSTOLIC (CONGESTIVE) AND DIASTOLIC (CONGESTIVE) HEART FAILURE: ICD-10-CM

## 2024-01-09 DIAGNOSIS — J30.9 ALLERGIC RHINITIS, UNSPECIFIED SEASONALITY, UNSPECIFIED TRIGGER: ICD-10-CM

## 2024-01-09 DIAGNOSIS — N25.81 SECONDARY HYPERPARATHYROIDISM OF RENAL ORIGIN: ICD-10-CM

## 2024-01-09 DIAGNOSIS — F33.1 MODERATE EPISODE OF RECURRENT MAJOR DEPRESSIVE DISORDER: ICD-10-CM

## 2024-01-09 DIAGNOSIS — J84.9 INTERSTITIAL PULMONARY DISEASE, UNSPECIFIED: Primary | ICD-10-CM

## 2024-01-09 DIAGNOSIS — K21.9 GASTROESOPHAGEAL REFLUX DISEASE, UNSPECIFIED WHETHER ESOPHAGITIS PRESENT: ICD-10-CM

## 2024-01-09 DIAGNOSIS — I82.4Y2 ACUTE DEEP VEIN THROMBOSIS (DVT) OF PROXIMAL VEIN OF LEFT LOWER EXTREMITY: ICD-10-CM

## 2024-01-09 DIAGNOSIS — J96.11 CHRONIC RESPIRATORY FAILURE WITH HYPOXIA: ICD-10-CM

## 2024-01-09 DIAGNOSIS — F32.A ANXIETY AND DEPRESSION: ICD-10-CM

## 2024-01-09 DIAGNOSIS — Z99.2 ESRD ON DIALYSIS: ICD-10-CM

## 2024-01-09 DIAGNOSIS — N18.6 ESRD ON DIALYSIS: ICD-10-CM

## 2024-01-09 DIAGNOSIS — E11.42 TYPE 2 DIABETES MELLITUS WITH DIABETIC POLYNEUROPATHY, WITHOUT LONG-TERM CURRENT USE OF INSULIN: ICD-10-CM

## 2024-01-09 DIAGNOSIS — J45.40 MODERATE PERSISTENT ASTHMA WITHOUT COMPLICATION: ICD-10-CM

## 2024-01-09 DIAGNOSIS — J45.21 MILD INTERMITTENT ASTHMA WITH EXACERBATION: ICD-10-CM

## 2024-01-09 DIAGNOSIS — K25.4 GASTROINTESTINAL HEMORRHAGE ASSOCIATED WITH GASTRIC ULCER: ICD-10-CM

## 2024-01-09 DIAGNOSIS — F41.9 ANXIETY AND DEPRESSION: ICD-10-CM

## 2024-01-09 DIAGNOSIS — E78.2 MIXED HYPERLIPIDEMIA: ICD-10-CM

## 2024-01-09 DIAGNOSIS — G61.81 CIDP (CHRONIC INFLAMMATORY DEMYELINATING POLYNEUROPATHY): ICD-10-CM

## 2024-01-09 PROBLEM — I82.542: Status: ACTIVE | Noted: 2024-01-09

## 2024-01-09 PROBLEM — L97.512 NON-PRESSURE CHRONIC ULCER OF OTHER PART OF RIGHT FOOT WITH FAT LAYER EXPOSED: Status: ACTIVE | Noted: 2024-01-09

## 2024-01-09 PROCEDURE — 99213 OFFICE O/P EST LOW 20 MIN: CPT | Mod: PBBFAC,PO

## 2024-01-09 PROCEDURE — 99999 PR PBB SHADOW E&M-EST. PATIENT-LVL III: CPT | Mod: PBBFAC,,,

## 2024-01-09 PROCEDURE — 99214 OFFICE O/P EST MOD 30 MIN: CPT | Mod: S$PBB,,,

## 2024-01-09 RX ORDER — SIMETHICONE 80 MG
80 TABLET,CHEWABLE ORAL EVERY 6 HOURS PRN
Qty: 90 TABLET | Refills: 0 | Status: SHIPPED | OUTPATIENT
Start: 2024-01-09 | End: 2024-04-08

## 2024-01-09 RX ORDER — ALBUTEROL SULFATE 90 UG/1
1-2 AEROSOL, METERED RESPIRATORY (INHALATION)
Qty: 18 G | Refills: 3 | Status: SHIPPED | OUTPATIENT
Start: 2024-01-09 | End: 2024-01-23 | Stop reason: SDUPTHER

## 2024-01-09 RX ORDER — MONTELUKAST SODIUM 10 MG/1
10 TABLET ORAL NIGHTLY
Qty: 90 TABLET | Refills: 3 | Status: SHIPPED | OUTPATIENT
Start: 2024-01-09

## 2024-01-09 RX ORDER — BUDESONIDE AND FORMOTEROL FUMARATE DIHYDRATE 160; 4.5 UG/1; UG/1
2 AEROSOL RESPIRATORY (INHALATION) EVERY 12 HOURS
Qty: 10.2 G | Refills: 3 | Status: SHIPPED | OUTPATIENT
Start: 2024-01-09 | End: 2024-01-23

## 2024-01-09 RX ORDER — ASPIRIN 81 MG/1
81 TABLET ORAL DAILY
Qty: 90 TABLET | Refills: 3 | Status: SHIPPED | OUTPATIENT
Start: 2024-01-09 | End: 2024-01-19

## 2024-01-09 RX ORDER — METOPROLOL SUCCINATE 25 MG/1
25 TABLET, EXTENDED RELEASE ORAL DAILY
Qty: 90 TABLET | Refills: 3 | Status: SHIPPED | OUTPATIENT
Start: 2024-01-09 | End: 2025-01-08

## 2024-01-09 RX ORDER — DOXAZOSIN 2 MG/1
1 TABLET ORAL
Qty: 24 TABLET | Refills: 3 | Status: SHIPPED | OUTPATIENT
Start: 2024-01-09 | End: 2025-01-08

## 2024-01-09 RX ORDER — FINASTERIDE 5 MG/1
5 TABLET, FILM COATED ORAL DAILY
Qty: 90 TABLET | Refills: 3 | Status: SHIPPED | OUTPATIENT
Start: 2024-01-09 | End: 2025-01-08

## 2024-01-09 RX ORDER — BUPROPION HYDROCHLORIDE 75 MG/1
75 TABLET ORAL 2 TIMES DAILY
Qty: 180 TABLET | Refills: 3 | Status: SHIPPED | OUTPATIENT
Start: 2024-01-09 | End: 2024-01-29 | Stop reason: SDUPTHER

## 2024-01-09 RX ORDER — IPRATROPIUM BROMIDE AND ALBUTEROL SULFATE 2.5; .5 MG/3ML; MG/3ML
3 SOLUTION RESPIRATORY (INHALATION) EVERY 4 HOURS PRN
Qty: 75 ML | Refills: 3 | Status: SHIPPED | OUTPATIENT
Start: 2024-01-09 | End: 2024-01-23 | Stop reason: SDUPTHER

## 2024-01-09 RX ORDER — GABAPENTIN 800 MG/1
400 TABLET ORAL 2 TIMES DAILY
Qty: 90 TABLET | Refills: 3 | Status: SHIPPED | OUTPATIENT
Start: 2024-01-09 | End: 2024-01-29 | Stop reason: SDUPTHER

## 2024-01-09 RX ORDER — FLUOXETINE HYDROCHLORIDE 60 MG/1
1 TABLET, FILM COATED ORAL; ORAL DAILY
Qty: 90 TABLET | Refills: 3 | Status: SHIPPED | OUTPATIENT
Start: 2024-01-09 | End: 2024-02-20 | Stop reason: SDUPTHER

## 2024-01-09 RX ORDER — ONDANSETRON 8 MG/1
8 TABLET, ORALLY DISINTEGRATING ORAL EVERY 12 HOURS PRN
Qty: 180 TABLET | Refills: 2 | Status: SHIPPED | OUTPATIENT
Start: 2024-01-09 | End: 2024-10-05

## 2024-01-09 RX ORDER — FUROSEMIDE 40 MG/1
80 TABLET ORAL
Qty: 75 TABLET | Refills: 3 | Status: SHIPPED | OUTPATIENT
Start: 2024-01-09

## 2024-01-09 RX ORDER — HYDROGEN PEROXIDE 3 %
20 SOLUTION, NON-ORAL MISCELLANEOUS 2 TIMES DAILY
Qty: 180 CAPSULE | Refills: 3 | Status: SHIPPED | OUTPATIENT
Start: 2024-01-09 | End: 2024-01-19

## 2024-01-09 RX ORDER — ATORVASTATIN CALCIUM 80 MG/1
80 TABLET, FILM COATED ORAL DAILY
Qty: 90 TABLET | Refills: 3 | Status: SHIPPED | OUTPATIENT
Start: 2024-01-09

## 2024-01-09 NOTE — PROGRESS NOTES
Name: Poncho Ahn Jr.  MRN: [id number]  : 1945  PCP: Missy Mccoy MD    HPI    Patient followed Dr. Mccoy, new to me. He still needs to establish care with new PCP. Presents today for medication refill. [facility] dialysis schedule. Denies any acute complaints at this time.    Review of Systems   Respiratory:  Negative for cough and shortness of breath.    Cardiovascular:  Negative for chest pain, palpitations and leg swelling.   Gastrointestinal:  Negative for nausea and vomiting.       Patient Active Problem List   Diagnosis    CIDP (chronic inflammatory demyelinating polyneuropathy)    Acute hypoxemic respiratory failure    Uncontrolled hypertension    Thyroid disease    Hyperlipemia    Diabetes mellitus, type 2    Bronchitis    ESRD on dialysis    Personal history of COVID-19    ACP (advance care planning)    Personal history of DVT (deep vein thrombosis)    GERD (gastroesophageal reflux disease)    Pulmonary edema    Hyponatremia    Interstitial pulmonary disease, unspecified    Moderate episode of recurrent major depressive disorder    Chronic respiratory failure with hypoxia    Chronic combined systolic (congestive) and diastolic (congestive) heart failure    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     Appearance: He is well-developed.   HENT:      Head: Normocephalic and atraumatic.      Right Ear: External ear normal.      Left Ear: External ear normal.   Eyes:      Conjunctiva/sclera: Conjunctivae normal.      Pupils: Pupils are equal, round, and reactive to light.   Neck:      Thyroid: No thyromegaly.   Cardiovascular:      Rate and Rhythm: Normal rate and regular rhythm.      Pulses: Normal pulses.      Heart sounds: S1  normal and S2 normal. Murmur heard.   Pulmonary:      Effort: Pulmonary effort is normal. No respiratory distress.      Breath sounds: Normal breath sounds.   Chest:      Chest wall: No tenderness.   Musculoskeletal:         General: No swelling or tenderness. Normal range of motion.      Cervical back: Normal range of motion and neck supple.   Skin:     General: Skin is warm and dry.      Coloration: Skin is not jaundiced or pale.   Neurological:      General: No focal deficit present.      Mental Status: He is alert and oriented to person, place, and time.      Cranial Nerves: No cranial nerve deficit.   Psychiatric:         Mood and Affect: Mood normal.         Behavior: Behavior normal.         1. Interstitial pulmonary disease, unspecified   Chronic, continue current medications    2. Moderate persistent asthma without complication  -     budesonide-formoterol 160-4.5 mcg (SYMBICORT) 160-4.5 mcg/actuation HFAA; Inhale 2 puffs into the lungs every 12 (twelve) hours. Controller  Dispense: 10.2 g; Refill: 3     -     albuterol-ipratropium (DUO-NEB) 2.5 mg-0.5 mg/3 mL nebulizer solution; Take 3 mLs by nebulization every 4 (four) hours as needed for Wheezing or Shortness of Breath. Rescue  Dispense: 75 mL; Refill: 3    3. Allergic rhinitis, unspecified seasonality, unspecified trigger  -     budesonide-formoterol 160-4.5 mcg (SYMBICORT) 160-4.5 mcg/actuation HFAA; Inhale 2 puffs into the lungs every 12 (twelve) hours. Controller  Dispense: 10.2 g; Refill: 3  -     montelukast (SINGULAIR) 10 mg tablet; Take 1 tablet (10 mg total) by mouth every evening.  Dispense: 90 tablet; Refill: 3    4. Gastrointestinal hemorrhage associated with gastric ulcer  -     esomeprazole (NEXIUM) 20 MG capsule; Take 1 capsule (20 mg total) by mouth 2 (two) times daily.  Dispense: 180 capsule; Refill: 3    5. Anxiety and depression  -     buPROPion (WELLBUTRIN) 75 MG tablet; Take 1 tablet (75 mg total) by mouth 2 (two) times daily.   Dispense: 180 tablet; Refill: 3  -     FLUoxetine 60 mg Tab; Take 1 tablet by mouth once daily.  Dispense: 90 tablet; Refill: 3    6. CIDP (chronic inflammatory demyelinating polyneuropathy)  -     gabapentin (NEURONTIN) 800 MG tablet; Take 0.5 tablets (400 mg total) by mouth 2 (two) times daily.  Dispense: 90 tablet; Refill: 3    7. Moderate episode of recurrent major depressive disorder   Chronic, continue current medications. Stable    10. Chronic respiratory failure with hypoxia  -     aspirin (ECOTRIN) 81 MG EC tablet; Take 1 tablet (81 mg total) by mouth once daily.  Dispense: 90 tablet; Refill: 3    11. Chronic combined systolic (congestive) and diastolic (congestive) heart failure  -     furosemide (LASIX) 40 MG tablet; Take 2 tablets (80 mg total) by mouth every Tues, Thurs, Sat. Can utilize an additional 40 mg po q afternoon on non-dialysis days PRN increased SOB or swelling.  Dispense: 75 tablet; Refill: 3  -     metoprolol succinate (TOPROL-XL) 25 MG 24 hr tablet; Take 1 tablet (25 mg total) by mouth once daily.  Dispense: 90 tablet; Refill: 3    12. Secondary hyperparathyroidism of renal origin   Chronic, follows with nephrology    13. ESRD on dialysis  -     doxazosin (CARDURA) 2 MG tablet; Take 0.5 tablets (1 mg total) by mouth every Tuesday, Thursday, Saturday, Sunday. TAKEN AT BEDTIME ON TUE, THUR, SAT, & SUN  Dispense: 24 tablet; Refill: 3  -     ondansetron (ZOFRAN-ODT) 8 MG TbDL; Take 1 tablet (8 mg total) by mouth every 12 (twelve) hours as needed.  Dispense: 180 tablet; Refill: 2    14. Type 2 diabetes mellitus with diabetic polyneuropathy, without long-term current use of insulin   Resume current management    16. Mixed hyperlipidemia   Continue statin therapy     Other orders  -     albuterol (PROVENTIL/VENTOLIN HFA) 90 mcg/actuation inhaler; Inhale 1-2 puffs into the lungs every 4 to 6 hours as needed for Wheezing or Shortness of Breath.  Dispense: 18 g; Refill: 3  -     atorvastatin  (LIPITOR) 80 MG tablet; Take 1 tablet (80 mg total) by mouth once daily.  Dispense: 90 tablet; Refill: 3  -     finasteride (PROSCAR) 5 mg tablet; Take 1 tablet (5 mg total) by mouth once daily.  Dispense: 90 tablet; Refill: 3  -     simethicone (MYLICON) 80 MG chewable tablet; Take 1 tablet (80 mg total) by mouth every 6 (six) hours as needed for Flatulence.  Dispense: 90 tablet; Refill: 0        Follow up in 3 months      IDA Fernandez  01/09/2024       no rash/no itching

## 2024-01-11 ENCOUNTER — TELEPHONE (OUTPATIENT)
Dept: NEUROLOGY | Facility: CLINIC | Age: 79
End: 2024-01-11
Payer: MEDICARE

## 2024-01-11 NOTE — TELEPHONE ENCOUNTER
Left message to call in regards to scheduling an appt for CIDP (chronic inflammatory demyelinating polyneuropathy).

## 2024-01-18 ENCOUNTER — TELEPHONE (OUTPATIENT)
Dept: FAMILY MEDICINE | Facility: CLINIC | Age: 79
End: 2024-01-18
Payer: MEDICARE

## 2024-01-18 NOTE — TELEPHONE ENCOUNTER
----- Message from Yoana Patricia sent at 1/18/2024 10:07 AM CST -----  Contact: Self  Patient is calling in regards to his esomeprazole (NEXIUM) 20 MG capsule stated [facility] is requesting a PA for this medication and is calling to have us reach out to them at the PA dept # [phone]. Can we please check on this and call pt back if needed at [phone]. Thank You.

## 2024-01-18 NOTE — TELEPHONE ENCOUNTER
----- Message from Yoana Patricia sent at 1/18/2024 10:07 AM CST -----  Contact: Self  Patient is calling in regards to his esomeprazole (NEXIUM) 20 MG capsule stated [facility] is requesting a PA for this medication and is calling to have us reach out to them at the PA dept # [phone]. Can we please check on this and call pt back if needed at [phone]. Thank You.

## 2024-01-19 DIAGNOSIS — J96.11 CHRONIC RESPIRATORY FAILURE WITH HYPOXIA: ICD-10-CM

## 2024-01-19 DIAGNOSIS — K21.9 GASTROESOPHAGEAL REFLUX DISEASE, UNSPECIFIED WHETHER ESOPHAGITIS PRESENT: Primary | ICD-10-CM

## 2024-01-19 RX ORDER — ASPIRIN 81 MG/1
81 TABLET ORAL
Qty: 90 TABLET | Refills: 3 | Status: SHIPPED | OUTPATIENT
Start: 2024-01-19

## 2024-01-19 RX ORDER — PANTOPRAZOLE SODIUM 40 MG/1
40 TABLET, DELAYED RELEASE ORAL DAILY
Qty: 30 TABLET | Refills: 11 | Status: SHIPPED | OUTPATIENT
Start: 2024-01-19 | End: 2025-01-18

## 2024-01-29 ENCOUNTER — TELEPHONE (OUTPATIENT)
Dept: FAMILY MEDICINE | Facility: CLINIC | Age: 79
End: 2024-01-29
Payer: MEDICARE

## 2024-01-29 RX ORDER — GABAPENTIN 800 MG/1
400 TABLET ORAL 2 TIMES DAILY
Qty: 90 TABLET | Refills: 3 | Status: SHIPPED | OUTPATIENT
Start: 2024-01-29 | End: 2025-01-28

## 2024-01-29 RX ORDER — BUPROPION HYDROCHLORIDE 75 MG/1
75 TABLET ORAL 2 TIMES DAILY
Qty: 180 TABLET | Refills: 3 | Status: SHIPPED | OUTPATIENT
Start: 2024-01-29 | End: 2025-01-28

## 2024-01-29 NOTE — TELEPHONE ENCOUNTER
----- Message from Rebekah Carl sent at 1/29/2024  8:14 AM CST -----  Regarding: RX Refill Request  Contact: Daughter at [phone]  Type:  RX Refill Request    Who Called:  Daughter at [phone]    Preferred Pharmacy with phone number:    [facility] [address]  [address] [address]  [facility] [address]  Phone: [phone] Fax: [phone]    Additional Information:  patient will be having medication sent through mailservice, but has run out of the two below since Saturday. Please prescribe the regular prescription for these two to the local pharmacy above. Please call and advise. Thank you    gabapentin (NEURONTIN) 800 MG tablet 90 tablet 3 1/9/2024 1/8/2025   Sig - Route: Take 0.5 tablets (400 mg total) by mouth 2 (two) times daily. - Oral   Sent to pharmacy as: gabapentin (NEURONTIN) 800 MG tablet   E-Prescribing Status: Receipt confirmed by pharmacy (1/9/2024  3:14 PM CST)     buPROPion (WELLBUTRIN) 75 MG tablet 180 tablet 3 1/9/2024 1/8/2025   Sig - Route: Take 1 tablet (75 mg total) by mouth 2 (two) times daily. - Oral   Sent to pharmacy as: buPROPion (WELLBUTRIN) 75 MG tablet   E-Prescribing Status: Receipt confirmed by pharmacy (1/9/2024  3:12 PM CST)

## 2024-02-01 ENCOUNTER — TELEPHONE (OUTPATIENT)
Dept: NEPHROLOGY | Facility: CLINIC | Age: 79
End: 2024-02-01
Payer: MEDICARE

## 2024-02-01 ENCOUNTER — TELEPHONE (OUTPATIENT)
Dept: FAMILY MEDICINE | Facility: CLINIC | Age: 79
End: 2024-02-01
Payer: MEDICARE

## 2024-02-01 NOTE — TELEPHONE ENCOUNTER
Attempted to contact pt had to leave a vm.  Dr Raines will handle his situation tomorrow at [facility].  If symptoms worsen go to ER.

## 2024-02-01 NOTE — TELEPHONE ENCOUNTER
----- Message from Clotilde Armstrong sent at 2/1/2024  8:11 AM CST -----  Contact: pt [phone]  Type: Needs Medical Advice  Who Called:  Pt   Symptoms (please be specific):  dry heaving/ post nasal drip w/ green mucus   How long has patient had these symptoms:  24hrs   Pharmacy name and phone #:    [facility] [address]  [address] [facility] [address] [address]  Phone: [phone] Fax: [phone]      Best Call Back Number: [phone]    Additional Information: Pt advised he is a dialysis pt and after his treatment yesterday he began having dry heaves and not feeling well. Pt also is requesting rx for anxiety. Pls call back and advise

## 2024-02-01 NOTE — TELEPHONE ENCOUNTER
Elaine Swartz Staff; Meme Echols MA  Caller: Unspecified (Today,  3:08 PM)  Type:  Patient Returning Call    Who Called:Pt    Who Left Message for Patient:Meme    Does the patient know what this is regarding?:n/a    Would the patient rather a call back or a response via [facility]? Call back    Best Call Back Number:[phone]    Additional Information: Pt missed a call and would like a call back. Thank you

## 2024-02-02 ENCOUNTER — TELEPHONE (OUTPATIENT)
Dept: FAMILY MEDICINE | Facility: CLINIC | Age: 79
End: 2024-02-02
Payer: MEDICARE

## 2024-02-02 NOTE — TELEPHONE ENCOUNTER
----- Message from Sandra Redd sent at 2/1/2024  3:33 PM CST -----  Regarding: return call  Contact: patient  Type:  Patient Returning Call    Who Called:  patient  Who Left Message for Patient:  Dione  Does the patient know what this is regarding?:    Best Call Back Number:  [phone]  Additional Information:  Please call patient to advise.  Thanks!

## 2024-02-12 ENCOUNTER — TELEPHONE (OUTPATIENT)
Dept: NEPHROLOGY | Facility: CLINIC | Age: 79
End: 2024-02-12
Payer: MEDICARE

## 2024-02-12 NOTE — TELEPHONE ENCOUNTER
----- Message from Rosey Cabral sent at 2/12/2024  2:45 PM CST -----  Type: Needs Medical Advice  Who Called:  pt  Best Call Back Number: [phone]  Additional Information: pt is calling the office to speak with the nurse asap today if at all possible. The pt is not feeling too well as he has had back pain around his kidneys for several days. Please call back to advise. Thanks!

## 2024-02-12 NOTE — TELEPHONE ENCOUNTER
Dialysis patient is having back pain around kidney. Did you see this patient today. Please advise.

## 2024-02-20 DIAGNOSIS — F41.9 ANXIETY AND DEPRESSION: ICD-10-CM

## 2024-02-20 DIAGNOSIS — F32.A ANXIETY AND DEPRESSION: ICD-10-CM

## 2024-02-20 RX ORDER — FLUOXETINE HYDROCHLORIDE 60 MG/1
1 TABLET, FILM COATED ORAL; ORAL DAILY
Qty: 90 TABLET | Refills: 3 | Status: SHIPPED | OUTPATIENT
Start: 2024-02-20

## 2024-02-20 NOTE — TELEPHONE ENCOUNTER
----- Message from Missy Lutz sent at 2/20/2024 11:03 AM CST -----  Type: Needs Medical Advice  Who Called:  pt     Best Call Back Number: [phone] (home)     Additional Information: pt requesting call back in regards to a script requested for anxiety and depression wants [facility] mail order and not a local pharmacy . Also requesting a 90 day supply with refills please advise       [facility] [place] [facility] [address] [facility]  [place] [address]  Phone: [phone] Fax: [phone]

## 2024-02-29 RX ORDER — HYOSCYAMINE SULFATE 0.125 MG
250 TABLET ORAL NIGHTLY
Start: 2024-02-29 | End: 2024-03-06 | Stop reason: SDUPTHER

## 2024-02-29 NOTE — TELEPHONE ENCOUNTER
----- Message from Kelsi Gonzalez sent at 2/29/2024  3:21 PM CST -----  Contact: pt  Type:  RX Refill Request    Who Called: pt    Refill or New Rx: NEW    RX Name and Strength:hyoscyamine (ANASPAZ,LEVSIN) 0.125 mg Tab    How is the patient currently taking it? (ex. 1XDay):as directed    Is this a 30 day or 90 day RX:90    Preferred Pharmacy with phone number:    [facility] - [place] - [facility] [address] [facility]  [place] [address]  Phone: [phone] Fax: [phone]    Local or Mail Order:mail order    Ordering Provider:Jeremías    Would the patient rather a call back or a response via [facility]? Call back    Best Call Back Number:[phone]    Additional Information: please send new script to above pharmacy, they never received it    Thanks

## 2024-03-05 ENCOUNTER — EXTERNAL HOME HEALTH (OUTPATIENT)
Dept: HOME HEALTH SERVICES | Facility: HOSPITAL | Age: 79
End: 2024-03-05
Payer: MEDICARE

## 2024-03-06 RX ORDER — HYOSCYAMINE SULFATE 0.125 MG
250 TABLET ORAL NIGHTLY
Start: 2024-03-06 | End: 2024-04-02 | Stop reason: SDUPTHER

## 2024-03-06 NOTE — TELEPHONE ENCOUNTER
----- Message from Tarsha Rubio NP sent at 3/6/2024  8:02 AM CST -----  Contact: self  Looks as though he's established care w/ a new PCP. Please forward medication refill to this provider.   ----- Message -----  From: Alyse Vazquez MA  Sent: 3/5/2024   3:40 PM CST  To: Missy Mccoy MD      ----- Message -----  From: Otilia Franco  Sent: 3/5/2024   2:35 PM CST  To: Jeremías Ross Staff    Type:  RX Refill Request    Who Called:  Patient  Refill or New Rx:  Refill  RX Name and Strength:  hyoscyamine (ANASPAZ,LEVSIN) 0.125 mg Tab  How is the patient currently taking it? (ex. 1XDay):  as directed  Is this a 30 day or 90 day RX:  as directed  Preferred Pharmacy with phone number:      [facility] - [place] - [facility] [address] [facility]  [place] [address]  Phone: [phone] Fax: [phone]      Local or Mail Order:  Mail  Ordering Provider:  Jeremías Tam Call Back Number:  [phone]    Additional Information:  Pt states he need a refill.Please advise

## 2024-03-19 ENCOUNTER — DOCUMENT SCAN (OUTPATIENT)
Dept: HOME HEALTH SERVICES | Facility: HOSPITAL | Age: 79
End: 2024-03-19
Payer: MEDICARE

## 2024-03-26 DIAGNOSIS — Z77.090 ASBESTOS EXPOSURE: ICD-10-CM

## 2024-03-26 DIAGNOSIS — J84.9 INTERSTITIAL PULMONARY DISEASE, UNSPECIFIED: ICD-10-CM

## 2024-03-26 DIAGNOSIS — R05.3 CHRONIC COUGH: ICD-10-CM

## 2024-03-26 DIAGNOSIS — J45.40 MODERATE PERSISTENT ASTHMA WITHOUT COMPLICATION: ICD-10-CM

## 2024-03-26 RX ORDER — ALBUTEROL SULFATE 90 UG/1
1-2 AEROSOL, METERED RESPIRATORY (INHALATION)
Qty: 54 G | Refills: 3 | OUTPATIENT
Start: 2024-03-26

## 2024-03-26 RX ORDER — HYOSCYAMINE SULFATE 0.125 MG
250 TABLET ORAL NIGHTLY
Start: 2024-03-26

## 2024-03-26 NOTE — TELEPHONE ENCOUNTER
----- Message from Adali Austin sent at 3/26/2024 12:55 PM CDT -----  Regarding: Needs return call  Type: Needs Medical Advice  Who Called:  Poncho Tam Call Back Number: [phone]    Additional Information: Pt is needing to speak to someone regarding if dr espana. Pt is calling regarding if theres anyway dr guevara can prescribe him XL pullups, [facility] unless he has a script from dr don au        
----- Message from Corin Shahab sent at 3/26/2024  1:22 PM CDT -----  Regarding: rx for adult underwear  Contact: pt  Type:  Needs Medical Advice    Who Called: pt    Pharmacy name and phone #:      [facility] - [place] - [facility] AT Portal to Registered [facility]  [place] [address]  Phone: [phone] Fax: [phone]    Would the patient rather a call back or a response via [facility]? Call back    Best Call Back Number: [phone]    Additional Information: pt is asking for a prescription for adult underwear.  It has to say that they are medically necessary. Pt needs it to say 'unlimited'.  Please send to pharm listed above. Please advise.  Thank you.        
----- Message from Sandra Redd sent at 3/26/2024  8:51 AM CDT -----  Regarding: refill  Contact: patient  Type:  RX Refill Request    Who Called:  patient  Refill or New Rx:  refill  RX Name and Strength:  hyoscyamine (ANASPAZ,LEVSIN) 0.125 mg Tab    albuterol (PROVENTIL/VENTOLIN HFA) 90 mcg/actuation inhaler    How is the patient currently taking it? (ex. 1XDay):  as directed  Is this a 30 day or 90 day RX:  90  Preferred Pharmacy with phone number:      [facility] [place] - [facility] AT Portal to Registered [facility]  [place] [address]  Phone: [phone] Fax: [phone]    Local or Mail Order:  local  Ordering Provider:  Dr. Anand Tam Call Back Number:  [phone]  Additional Information:  Patient is out of medication.  Please call patient to advise.  Thanks!      
Patient needs an appt, either with Dr. Mccoy or Dr. Michel.   
Please see refill request  LOV:  5/16/23  Next visit 5/9/24   
2. The status of comorbities. (See ED/admit documents)

## 2024-03-28 DIAGNOSIS — J84.9 INTERSTITIAL PULMONARY DISEASE, UNSPECIFIED: ICD-10-CM

## 2024-03-28 DIAGNOSIS — R05.3 CHRONIC COUGH: ICD-10-CM

## 2024-03-28 DIAGNOSIS — J45.40 MODERATE PERSISTENT ASTHMA WITHOUT COMPLICATION: ICD-10-CM

## 2024-03-28 DIAGNOSIS — Z77.090 ASBESTOS EXPOSURE: ICD-10-CM

## 2024-03-28 NOTE — TELEPHONE ENCOUNTER
----- Message from Elaine Swartz sent at 3/28/2024 12:46 PM CDT -----  Regarding: Call back  Type:  RX Refill Request    Who Called: Pt    Refill or New Rx:Refill    RX Name and Strength:hyoscyamine (ANASPAZ,LEVSIN) 0.125 mg Tab And albuterol (PROVENTIL/VENTOLIN HFA) 90 mcg/actuation inhaler       Preferred Pharmacy with phone number:.       [facility] - [place] - [facility] AT Portal to [facility]  [place] [address]  Phone: [phone] Fax: [phone]    Local or Mail Order:Mail    Would the patient rather a call back or a response via [facility]? Call back    Best Call Back Number:[phone]    Additional Information: Pt made a request 2wks ago for his refill and his refill was not sent to Rx. Pt would like a refill asap. Thank you

## 2024-03-28 NOTE — TELEPHONE ENCOUNTER
----- Message from Elaine Swartz sent at 3/28/2024 12:46 PM CDT -----  Regarding: Call back  Type:  RX Refill Request    Who Called: Pt    Refill or New Rx:Refill    RX Name and Strength:hyoscyamine (ANASPAZ,LEVSIN) 0.125 mg Tab And albuterol (PROVENTIL/VENTOLIN HFA) 90 mcg/actuation inhaler       Preferred Pharmacy with phone number:.       [facility] - [place] - [facility] AT Portal to [facility]  [place] [address]  Phone: [phone] Fax: [phone]    Local or Mail Order:Mail    Would the patient rather a call back or a response via [facility]? Call back    Best Call Back Number:[phone]    Additional Information: Pt made a request 2wks ago for his refill and his refill was not sent to Rx. Pt would like a refill asap. Thank you

## 2024-03-29 NOTE — TELEPHONE ENCOUNTER
Please call this patient he will need refills from new PCP and he has not seen me in almost a year.

## 2024-03-30 ENCOUNTER — DOCUMENT SCAN (OUTPATIENT)
Dept: HOME HEALTH SERVICES | Facility: HOSPITAL | Age: 79
End: 2024-03-30

## 2024-04-01 RX ORDER — ALBUTEROL SULFATE 90 UG/1
1-2 AEROSOL, METERED RESPIRATORY (INHALATION)
Qty: 54 G | Refills: 3 | OUTPATIENT
Start: 2024-04-01

## 2024-04-01 RX ORDER — HYOSCYAMINE SULFATE 0.125 MG
250 TABLET ORAL NIGHTLY
Start: 2024-04-01

## 2024-04-02 ENCOUNTER — TELEPHONE (OUTPATIENT)
Dept: FAMILY MEDICINE | Facility: CLINIC | Age: 79
End: 2024-04-02
Payer: MEDICARE

## 2024-04-02 RX ORDER — HYOSCYAMINE SULFATE 0.125 MG
250 TABLET ORAL NIGHTLY
Start: 2024-04-02 | End: 2024-04-02 | Stop reason: SDUPTHER

## 2024-04-02 NOTE — TELEPHONE ENCOUNTER
----- Message from Kelsi Gonzalez sent at 4/2/2024  9:15 AM CDT -----  Regarding: medication refill, first call in feb.  Contact: pt  Type:  RX Refill Request    FIFTH CALL ABOUT THIS    Who Called: pt    Refill or New Rx: refill    RX Name and Strength: hyoscyamine (ANASPAZ,LEVSIN) 0.125 mg Tab    How is the patient currently taking it? (ex. 1XDay):as directed    Is this a 30 day or 90 day RX:90    Preferred Pharmacy with phone number:    [facility] - [place] - [facility] AT Portal to Registered [facility]  [place] [address]  Phone: [phone] Fax: [phone]    Local or Mail Order: mail order    Ordering Provider: Jeremías    Would the patient rather a call back or a response via [facility]? Call back    Best Call Back Number:[phone]    Additional Information:  Pt is almost OUT of medication and is requesting again for this to be refilled.     FIFTH call, 2/29, 3/05, 3/26, 3/28, 4/2    Please refill medication    Thanks

## 2024-04-02 NOTE — TELEPHONE ENCOUNTER
Dr. Michel is not this patients provider. He have seen him before and Dr. Michel is not accepting new patients.

## 2024-04-02 NOTE — TELEPHONE ENCOUNTER
Please call him and let him know 30 day sent to local pharmacy remaining refills will need to be done with Dr. Michel

## 2024-04-02 NOTE — TELEPHONE ENCOUNTER
----- Message from Kelsi Gonzalez sent at 4/2/2024  9:15 AM CDT -----  Regarding: medication refill, first call in feb.  Contact: pt  Type:  RX Refill Request    FIFTH CALL ABOUT THIS    Who Called: pt    Refill or New Rx: refill    RX Name and Strength: hyoscyamine (ANASPAZ,LEVSIN) 0.125 mg Tab    How is the patient currently taking it? (ex. 1XDay):as directed    Is this a 30 day or 90 day RX:90    Preferred Pharmacy with phone number:    [facility] - [place] - [facility] AT Portal to Registered [facility]  [place] [address]  Phone: [phone] Fax: [phone]    Local or Mail Order: mail order    Ordering Provider: Jeremías    Would the patient rather a call back or a response via [facility]? Call back    Best Call Back Number:[phone]    Additional Information:  Pt is almost OUT of medication and is requesting again for this to be refilled.     FIFTH call, 2/29, 3/05, 3/26, 3/28, 4/2    Please refill medication    Thanks

## 2024-04-11 ENCOUNTER — DOCUMENT SCAN (OUTPATIENT)
Dept: HOME HEALTH SERVICES | Facility: HOSPITAL | Age: 79
End: 2024-04-11
Payer: MEDICARE

## 2024-04-15 ENCOUNTER — DOCUMENT SCAN (OUTPATIENT)
Dept: HOME HEALTH SERVICES | Facility: HOSPITAL | Age: 79
End: 2024-04-15
Payer: MEDICARE

## 2024-04-15 PROBLEM — J96.11 CHRONIC RESPIRATORY FAILURE WITH HYPOXIA: Status: RESOLVED | Noted: 2024-01-09 | Resolved: 2024-04-15

## 2024-05-02 ENCOUNTER — DOCUMENT SCAN (OUTPATIENT)
Dept: HOME HEALTH SERVICES | Facility: HOSPITAL | Age: 79
End: 2024-05-02
Payer: MEDICARE

## 2024-05-09 ENCOUNTER — TELEPHONE (OUTPATIENT)
Dept: FAMILY MEDICINE | Facility: CLINIC | Age: 79
End: 2024-05-09
Payer: MEDICARE

## 2024-05-09 DIAGNOSIS — G61.81 CIDP (CHRONIC INFLAMMATORY DEMYELINATING POLYNEUROPATHY): ICD-10-CM

## 2024-05-09 RX ORDER — GABAPENTIN 400 MG/1
800 CAPSULE ORAL 3 TIMES DAILY
Qty: 180 CAPSULE | Refills: 11 | Status: SHIPPED | OUTPATIENT
Start: 2024-05-09 | End: 2025-05-09

## 2024-05-09 NOTE — TELEPHONE ENCOUNTER
----- Message from Cody Veronica PA-C sent at 5/9/2024 11:23 AM CDT -----  Contact: yao lópez  Medication filled  ----- Message -----  From: Alyse Vazquez MA  Sent: 5/9/2024  10:57 AM CDT  To: Cody Veronica PA-C      ----- Message -----  From: Stephanie Gonsales  Sent: 5/9/2024  10:05 AM CDT  To: Jeremías Ross Staff    Type:  RX Refill Request    Who Called:  yao lópez  Refill or New Rx:  refill  RX Name and Strength:  gabapentin (NEURONTIN) 800 MG tablet  Medication  Date: 1/29/2024 Department: [facility] Ordering/Authorizing: Cody Veronica PA-C         How is the patient currently taking it? (ex. 1XDay):  as directed  Is this a 30 day or 90 day RX:  90  Preferred Pharmacy with phone number:      [facility] [place] - [facility] AT Portal to Registered [facility]  [place] [address]  Phone: [phone] Fax: [phone]       Local or Mail Order:  mail  Ordering Provider:  cody Tam Call Back Number:  [phone]  Additional Information: pt wants 400 mg

## 2024-05-09 NOTE — TELEPHONE ENCOUNTER
Spoke with patients daughter, informed her medications were sent to pharmacy. Scheduled an ECA visit with Beverly Rolon for 7/16

## 2024-06-10 ENCOUNTER — TELEPHONE (OUTPATIENT)
Dept: NEPHROLOGY | Facility: CLINIC | Age: 79
End: 2024-06-10
Payer: MEDICARE

## 2024-06-10 DIAGNOSIS — G61.81 CIDP (CHRONIC INFLAMMATORY DEMYELINATING POLYNEUROPATHY): Primary | ICD-10-CM

## 2024-06-10 NOTE — TELEPHONE ENCOUNTER
Dr Espinoza would like this patient to have an appointment.  He is established with Dr Lucia.   Let me know how I can help.

## 2024-06-27 ENCOUNTER — HOSPITAL ENCOUNTER (OUTPATIENT)
Dept: RADIOLOGY | Facility: HOSPITAL | Age: 79
Discharge: HOME | End: 2024-06-27
Attending: EMERGENCY MEDICINE
Payer: MEDICARE

## 2024-06-27 DIAGNOSIS — N28.89 RENAL MASS, RIGHT: ICD-10-CM

## 2024-06-27 LAB — GLUCOSE SERPL-MCNC: 92 MG/DL (ref 70–110)

## 2024-06-27 PROCEDURE — 78815 PET IMAGE W/CT SKULL-THIGH: CPT | Mod: 26,PI,, | Performed by: RADIOLOGY

## 2024-06-27 PROCEDURE — A9552 F18 FDG: HCPCS | Mod: PN | Performed by: EMERGENCY MEDICINE

## 2024-06-27 PROCEDURE — 78815 PET IMAGE W/CT SKULL-THIGH: CPT | Mod: TC,PN

## 2024-06-27 RX ORDER — FLUDEOXYGLUCOSE F18 500 MCI/ML
12.2 INJECTION INTRAVENOUS
Status: COMPLETED | OUTPATIENT
Start: 2024-06-27 | End: 2024-06-27

## 2024-06-27 RX ADMIN — FLUDEOXYGLUCOSE F-18 12.2 MILLICURIE: 500 INJECTION INTRAVENOUS at 11:06

## 2024-06-27 NOTE — PROGRESS NOTES
PET Imaging Questionnaire    Are you a Diabetic? Recent Blood Sugar level? No    Are you anemic? Bone Marrow Stimulation Meds? No    Have you had a CT Scan, if so when & where was your last one? Yes -     Have you had a PET Scan, if so when & where was your last one? No    Chemotherapy or currently on Chemotherapy? No    Radiation therapy? No    Surgical History:   Past Surgical History:   Procedure Laterality Date    CHOLECYSTECTOMY      COLONOSCOPY N/A 10/11/2021    Procedure: COLONOSCOPY;  Surgeon: Nuno Zhang MD;  Location: [facility] ENDO;  Service: Endoscopy;  Laterality: N/A;    CREATION OF MARK FISTULA Left 3/3/2021    Procedure: CREATION, FISTULA, MARK;  Surgeon: Nguyen Cartwright MD;  Location: [facility] OR;  Service: Cardiovascular;  Laterality: Left;    ESOPHAGEAL DILATION N/A 10/11/2021    Procedure: DILATION, ESOPHAGUS;  Surgeon: Nuno Zhang MD;  Location: [facility] ENDO;  Service: Endoscopy;  Laterality: N/A;    ESOPHAGOGASTRODUODENOSCOPY N/A 10/11/2021    Procedure: EGD (ESOPHAGOGASTRODUODENOSCOPY);  Surgeon: Nuno Zhang MD;  Location: [facility] ENDO;  Service: Endoscopy;  Laterality: N/A;    HERNIA REPAIR      inguinal bilateral    INSERTION OF URETERAL CATHETER      right neck    LEFT HEART CATHETERIZATION Left 2/27/2021    Procedure: Left heart cath;  Surgeon: Teodoro Mckay III, MD;  Location: [facility] CATH;  Service: Cardiology;  Laterality: Left;    PARATHYROID GLAND SURGERY      TONSILLECTOMY      VASECTOMY          Have you been fasting for at least 6 hours? Yes    Is there any chance you may be pregnant or breastfeeding? No    Assay: 12.3 MCi@:11:22   Injection Site:RT AC    Residual: 0.1 mCi@: 11:26   Technologist: Frederic Elizondo Injected:12.2 mCi

## 2024-06-28 ENCOUNTER — TELEPHONE (OUTPATIENT)
Dept: UROLOGY | Facility: CLINIC | Age: 79
End: 2024-06-28
Payer: MEDICARE

## 2024-06-28 DIAGNOSIS — N28.89 OTHER SPECIFIED DISORDERS OF KIDNEY AND URETER: Primary | ICD-10-CM

## 2024-06-28 NOTE — TELEPHONE ENCOUNTER
----- Message from Darnell Felton MD sent at 6/28/2024 10:24 AM CDT -----  Dr. Neff with pulmonary referred this patient. Can y'all help arrange an MRI at [facility] and then follow up with me to discuss options for the right renal mass

## 2024-07-10 ENCOUNTER — PATIENT MESSAGE (OUTPATIENT)
Dept: RADIOLOGY | Facility: HOSPITAL | Age: 79
End: 2024-07-10
Payer: MEDICARE

## 2024-07-10 DIAGNOSIS — N28.89 RENAL MASS: Primary | ICD-10-CM

## 2024-07-11 ENCOUNTER — TELEPHONE (OUTPATIENT)
Dept: UROLOGY | Facility: CLINIC | Age: 79
End: 2024-07-11
Payer: MEDICARE

## 2024-08-09 RX ORDER — HYOSCYAMINE SULFATE 0.125 MG
250 TABLET ORAL NIGHTLY
Qty: 60 TABLET | Refills: 11 | Status: SHIPPED | OUTPATIENT
Start: 2024-08-09 | End: 2025-08-04

## 2024-11-18 ENCOUNTER — TELEPHONE (OUTPATIENT)
Dept: NEUROLOGY | Facility: CLINIC | Age: 79
End: 2024-11-18
Payer: MEDICARE

## 2024-11-18 NOTE — TELEPHONE ENCOUNTER
----- Message from Yoana sent at 11/18/2024 10:34 AM CST -----  Contact: Chantel - Daughter  Type: Needs Medical Advice  Who Called:  Patients daughter Chantel  Best Call Back Number: [phone]  Additional Information: Pts daughter is calling in regards to the appt he has scheduled tomorrow at 11am, stated his transportation has a scheduling conflict and cannot make it for that time and wanted to see if there was anyway we could move that appt to after 1:30. Can we please check on this and call back to advise. Thank You

## 2024-11-18 NOTE — TELEPHONE ENCOUNTER
Spoke with the pts daughter. Advised Dr Gray does not have an available appt for rescheduling until January. Daughter states she would see if her  can bring hi to his appt tomorrow.

## 2025-01-01 ENCOUNTER — PATIENT OUTREACH (OUTPATIENT)
Dept: ADMINISTRATIVE | Facility: CLINIC | Age: 80
End: 2025-01-01
Payer: MEDICARE

## 2025-01-09 ENCOUNTER — TELEPHONE (OUTPATIENT)
Dept: FAMILY MEDICINE | Facility: CLINIC | Age: 80
End: 2025-01-09
Payer: MEDICARE

## 2025-01-09 NOTE — TELEPHONE ENCOUNTER
----- Message from Noel sent at 1/9/2025  3:53 PM CST -----  Contact: Lizette/[facility]  Type: Needs Medical Advice  Who Called:  Lizette/[facility]    Best Call Back Number: [phone]  Additional Information: PT is telling nurse he doesn't have a glucometer.  They would like to get one ordered.  Please call when completed.      [facility] [address] [place] [address], [address]  [address] [facility], [address] [address]  Phone: [phone] Fax: [phone]

## 2025-03-09 PROBLEM — Z95.818 STATUS POST IMPLANTATION OF MITRAL VALVE LEAFLET CLIP: Status: ACTIVE | Noted: 2025-01-01

## 2025-03-09 PROBLEM — S91.301A WOUND OF RIGHT FOOT: Status: ACTIVE | Noted: 2025-01-01

## 2025-03-09 PROBLEM — Z98.890 STATUS POST IMPLANTATION OF MITRAL VALVE LEAFLET CLIP: Status: ACTIVE | Noted: 2025-01-01

## 2025-03-09 PROBLEM — J45.909 ASTHMA: Status: ACTIVE | Noted: 2025-01-01

## 2025-03-09 PROBLEM — I34.0 MITRAL VALVE REGURGITATION: Status: ACTIVE | Noted: 2025-01-01

## 2025-03-09 PROBLEM — I50.20 HFREF (HEART FAILURE WITH REDUCED EJECTION FRACTION): Status: ACTIVE | Noted: 2025-01-01

## 2025-03-09 PROBLEM — D69.6 THROMBOCYTOPENIA: Status: ACTIVE | Noted: 2025-01-01

## 2025-03-09 PROBLEM — J96.21 ACUTE ON CHRONIC RESPIRATORY FAILURE WITH HYPOXIA: Status: ACTIVE | Noted: 2025-01-01

## 2025-03-10 PROBLEM — S91.301A WOUND OF RIGHT FOOT: Status: RESOLVED | Noted: 2025-01-01 | Resolved: 2025-01-01

## 2025-03-10 PROBLEM — J18.9 MULTIFOCAL PNEUMONIA: Status: ACTIVE | Noted: 2025-01-01

## 2025-03-11 PROBLEM — R00.0 SINUS TACHYCARDIA: Status: ACTIVE | Noted: 2025-01-01

## 2025-03-12 PROBLEM — Z51.5 COMFORT MEASURES ONLY STATUS: Status: ACTIVE | Noted: 2025-01-01

## 2025-03-12 PROBLEM — I48.91 ATRIAL FIBRILLATION WITH RVR: Status: ACTIVE | Noted: 2025-01-01

## 2025-03-12 PROBLEM — Z51.5 ENCOUNTER FOR PALLIATIVE CARE: Status: ACTIVE | Noted: 2025-01-01
